# Patient Record
Sex: FEMALE | Race: WHITE | NOT HISPANIC OR LATINO
[De-identification: names, ages, dates, MRNs, and addresses within clinical notes are randomized per-mention and may not be internally consistent; named-entity substitution may affect disease eponyms.]

---

## 2018-03-19 PROBLEM — Z00.00 ENCOUNTER FOR PREVENTIVE HEALTH EXAMINATION: Status: ACTIVE | Noted: 2018-03-19

## 2018-03-23 ENCOUNTER — APPOINTMENT (OUTPATIENT)
Dept: SURGERY | Facility: CLINIC | Age: 68
End: 2018-03-23
Payer: MEDICARE

## 2018-03-23 VITALS
SYSTOLIC BLOOD PRESSURE: 147 MMHG | DIASTOLIC BLOOD PRESSURE: 80 MMHG | TEMPERATURE: 97.8 F | BODY MASS INDEX: 26.98 KG/M2 | OXYGEN SATURATION: 97 % | WEIGHT: 132.06 LBS | HEIGHT: 58.5 IN | HEART RATE: 95 BPM

## 2018-03-23 DIAGNOSIS — L72.3 SEBACEOUS CYST: ICD-10-CM

## 2018-03-23 DIAGNOSIS — Z85.3 PERSONAL HISTORY OF MALIGNANT NEOPLASM OF BREAST: ICD-10-CM

## 2018-03-23 DIAGNOSIS — Z78.9 OTHER SPECIFIED HEALTH STATUS: ICD-10-CM

## 2018-03-23 DIAGNOSIS — E78.00 PURE HYPERCHOLESTEROLEMIA, UNSPECIFIED: ICD-10-CM

## 2018-03-23 DIAGNOSIS — E07.9 DISORDER OF THYROID, UNSPECIFIED: ICD-10-CM

## 2018-03-23 PROCEDURE — 99203 OFFICE O/P NEW LOW 30 MIN: CPT

## 2018-03-23 RX ORDER — ATORVASTATIN CALCIUM 10 MG/1
10 TABLET, FILM COATED ORAL
Refills: 0 | Status: ACTIVE | COMMUNITY

## 2018-03-23 RX ORDER — LEVOTHYROXINE SODIUM 0.1 MG/1
100 TABLET ORAL
Refills: 0 | Status: ACTIVE | COMMUNITY

## 2020-01-22 ENCOUNTER — EMERGENCY (EMERGENCY)
Facility: HOSPITAL | Age: 70
LOS: 1 days | Discharge: HOME | End: 2020-01-22
Attending: EMERGENCY MEDICINE | Admitting: EMERGENCY MEDICINE
Payer: MEDICARE

## 2020-01-22 VITALS
HEIGHT: 62 IN | SYSTOLIC BLOOD PRESSURE: 166 MMHG | HEART RATE: 97 BPM | WEIGHT: 126.1 LBS | RESPIRATION RATE: 16 BRPM | OXYGEN SATURATION: 97 % | TEMPERATURE: 98 F | DIASTOLIC BLOOD PRESSURE: 100 MMHG

## 2020-01-22 VITALS — DIASTOLIC BLOOD PRESSURE: 86 MMHG | SYSTOLIC BLOOD PRESSURE: 148 MMHG

## 2020-01-22 PROCEDURE — 72125 CT NECK SPINE W/O DYE: CPT | Mod: 26

## 2020-01-22 PROCEDURE — 70450 CT HEAD/BRAIN W/O DYE: CPT | Mod: 26

## 2020-01-22 PROCEDURE — 99284 EMERGENCY DEPT VISIT MOD MDM: CPT

## 2020-01-22 PROCEDURE — 93010 ELECTROCARDIOGRAM REPORT: CPT

## 2020-01-22 NOTE — ED ADULT NURSE NOTE - CHIEF COMPLAINT QUOTE
struck head at home, doesn't remember circumstances, bleeding to scalp sent from [facility] for head ct

## 2020-01-22 NOTE — ED PROVIDER NOTE - PHYSICAL EXAMINATION
Physical Exam  GEN: Awake, alert, non-toxic appearing, NCAT  EYES: full EOMI,  ENT: External inspection normal, normal voice,   HEAD: right temporal scalp abrasion  NECK: FROM neck, supple, no midline tenderness/stepoff  CV: rrr  RESP: no tachypnea, no hypoxia, no resp distress,  MSK: FROM all 4 extremities,   SKIN: see HEAD exam  NEURO: Oriented x3, CN 2-12 grossly intact, moving all extremities, ambulating with cane (at baseline)

## 2020-01-22 NOTE — ED ADULT TRIAGE NOTE - CHIEF COMPLAINT QUOTE
struck head at home, doesn't remember circumstances, bleeding to scalp sent from [facility] for head ct

## 2020-01-22 NOTE — ED PROVIDER NOTE - OBJECTIVE STATEMENT
69 yof pw head injury.  pt states she may have slipped and fell earlier today, around noon.  reports occasional poor short term memory recall.  does not remember the exact details but states she was in the kitchen, where the floor is slippery and she wore socks instead of her shoes.  denies pain.  tdap < 5 yr.  hx of htn/hl.  states hx of syncope, and was instructed to lower herself down when she feels like an episode is coming.

## 2020-01-22 NOTE — ED ADULT NURSE NOTE - OBJECTIVE STATEMENT
68 y/o F with hx of HTN, takes a baby ASA 3x a week, c/o head injury. pt reports he floors are very slippery and she is not sure if she passed out or slipped and fell. does not recall events. pt denies nausea, vomiting. lac noted to scalp. no neuro deficits noted.

## 2020-01-22 NOTE — ED PROVIDER NOTE - CLINICAL SUMMARY MEDICAL DECISION MAKING FREE TEXT BOX
will check ct head/cervical spine, more likely slip/fall given history, screening ekg given unable to recall exact event,

## 2020-01-22 NOTE — ED PROVIDER NOTE - NSFOLLOWUPINSTRUCTIONS_ED_ALL_ED_FT
Follow up with your primary care doctor  Show the EKG from today to your primary care doctor/cardiologist  Take tylenol 650mg every 6 hours for pain as needed  Do NOT wash your hair for 2 days to allow the wound to heal  Return immediately for any new or worsening symptoms or any new concerns

## 2020-01-22 NOTE — ED PROVIDER NOTE - PATIENT PORTAL LINK FT
You can access the FollowMyHealth Patient Portal offered by [facility] by registering at the following website: [email or web address]. By joining [employer]’s FollowMyHealth portal, you will also be able to view your health information using other applications (apps) compatible with our system.
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
## 2020-01-27 DIAGNOSIS — S06.9X9A UNSPECIFIED INTRACRANIAL INJURY WITH LOSS OF CONSCIOUSNESS OF UNSPECIFIED DURATION, INITIAL ENCOUNTER: ICD-10-CM
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
## 2020-01-27 DIAGNOSIS — S00.01XA ABRASION OF SCALP, INITIAL ENCOUNTER: ICD-10-CM
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
## 2021-02-12 ENCOUNTER — EMERGENCY (EMERGENCY)
Facility: HOSPITAL | Age: 71
LOS: 1 days | Discharge: HOME | End: 2021-02-12
Admitting: EMERGENCY MEDICINE
Payer: MEDICARE

## 2021-02-12 VITALS
HEIGHT: 62 IN | DIASTOLIC BLOOD PRESSURE: 67 MMHG | TEMPERATURE: 100 F | SYSTOLIC BLOOD PRESSURE: 124 MMHG | HEART RATE: 111 BPM | WEIGHT: 125 LBS | OXYGEN SATURATION: 94 % | RESPIRATION RATE: 18 BRPM

## 2021-02-12 DIAGNOSIS — Z20.822 CONTACT WITH AND (SUSPECTED) EXPOSURE TO COVID-19: ICD-10-CM

## 2021-02-12 DIAGNOSIS — R50.9 FEVER, UNSPECIFIED: ICD-10-CM

## 2021-02-12 DIAGNOSIS — N39.0 URINARY TRACT INFECTION, SITE NOT SPECIFIED: ICD-10-CM

## 2021-02-12 DIAGNOSIS — Z88.2 ALLERGY STATUS TO SULFONAMIDES: ICD-10-CM

## 2021-02-12 LAB
ALBUMIN SERPL ELPH-MCNC: 2.9 G/DL — LOW (ref 3.4–5)
ALP SERPL-CCNC: 91 U/L — SIGNIFICANT CHANGE UP (ref 40–120)
ALT FLD-CCNC: 20 U/L — SIGNIFICANT CHANGE UP (ref 12–42)
AMPHET UR-MCNC: NEGATIVE — SIGNIFICANT CHANGE UP
ANION GAP SERPL CALC-SCNC: 11 MMOL/L — SIGNIFICANT CHANGE UP (ref 9–16)
APPEARANCE UR: CLEAR — SIGNIFICANT CHANGE UP
APTT BLD: 28.7 SEC — SIGNIFICANT CHANGE UP (ref 27.5–35.5)
AST SERPL-CCNC: 14 U/L — LOW (ref 15–37)
BACTERIA # UR AUTO: PRESENT /HPF
BARBITURATES UR SCN-MCNC: NEGATIVE — SIGNIFICANT CHANGE UP
BASOPHILS # BLD AUTO: 0.08 K/UL — SIGNIFICANT CHANGE UP (ref 0–0.2)
BASOPHILS NFR BLD AUTO: 0.5 % — SIGNIFICANT CHANGE UP (ref 0–2)
BENZODIAZ UR-MCNC: NEGATIVE — SIGNIFICANT CHANGE UP
BILIRUB SERPL-MCNC: 0.4 MG/DL — SIGNIFICANT CHANGE UP (ref 0.2–1.2)
BILIRUB UR-MCNC: NEGATIVE — SIGNIFICANT CHANGE UP
BUN SERPL-MCNC: 20 MG/DL — SIGNIFICANT CHANGE UP (ref 7–23)
CALCIUM SERPL-MCNC: 9.2 MG/DL — SIGNIFICANT CHANGE UP (ref 8.5–10.5)
CHLORIDE SERPL-SCNC: 103 MMOL/L — SIGNIFICANT CHANGE UP (ref 96–108)
CO2 SERPL-SCNC: 23 MMOL/L — SIGNIFICANT CHANGE UP (ref 22–31)
COCAINE METAB.OTHER UR-MCNC: NEGATIVE — SIGNIFICANT CHANGE UP
COLOR SPEC: YELLOW — SIGNIFICANT CHANGE UP
CREAT SERPL-MCNC: 0.86 MG/DL — SIGNIFICANT CHANGE UP (ref 0.5–1.3)
DIFF PNL FLD: ABNORMAL
EOSINOPHIL # BLD AUTO: 0.17 K/UL — SIGNIFICANT CHANGE UP (ref 0–0.5)
EOSINOPHIL NFR BLD AUTO: 1 % — SIGNIFICANT CHANGE UP (ref 0–6)
EPI CELLS # UR: SIGNIFICANT CHANGE UP /HPF (ref 0–5)
GLUCOSE SERPL-MCNC: 129 MG/DL — HIGH (ref 70–99)
GLUCOSE UR QL: NEGATIVE — SIGNIFICANT CHANGE UP
HCT VFR BLD CALC: 38.6 % — SIGNIFICANT CHANGE UP (ref 34.5–45)
HGB BLD-MCNC: 13.1 G/DL — SIGNIFICANT CHANGE UP (ref 11.5–15.5)
IMM GRANULOCYTES NFR BLD AUTO: 0.7 % — SIGNIFICANT CHANGE UP (ref 0–1.5)
INR BLD: 1.02 — SIGNIFICANT CHANGE UP (ref 0.88–1.16)
KETONES UR-MCNC: NEGATIVE — SIGNIFICANT CHANGE UP
LACTATE SERPL-SCNC: 1.4 MMOL/L — SIGNIFICANT CHANGE UP (ref 0.4–2)
LEUKOCYTE ESTERASE UR-ACNC: ABNORMAL
LIDOCAIN IGE QN: 109 U/L — SIGNIFICANT CHANGE UP (ref 73–393)
LYMPHOCYTES # BLD AUTO: 1.02 K/UL — SIGNIFICANT CHANGE UP (ref 1–3.3)
LYMPHOCYTES # BLD AUTO: 5.8 % — LOW (ref 13–44)
MAGNESIUM SERPL-MCNC: 2 MG/DL — SIGNIFICANT CHANGE UP (ref 1.6–2.6)
MCHC RBC-ENTMCNC: 31 PG — SIGNIFICANT CHANGE UP (ref 27–34)
MCHC RBC-ENTMCNC: 33.9 GM/DL — SIGNIFICANT CHANGE UP (ref 32–36)
MCV RBC AUTO: 91.3 FL — SIGNIFICANT CHANGE UP (ref 80–100)
METHADONE UR-MCNC: NEGATIVE — SIGNIFICANT CHANGE UP
MONOCYTES # BLD AUTO: 1.21 K/UL — HIGH (ref 0–0.9)
MONOCYTES NFR BLD AUTO: 6.9 % — SIGNIFICANT CHANGE UP (ref 2–14)
NEUTROPHILS # BLD AUTO: 14.85 K/UL — HIGH (ref 1.8–7.4)
NEUTROPHILS NFR BLD AUTO: 85.1 % — HIGH (ref 43–77)
NITRITE UR-MCNC: POSITIVE
NRBC # BLD: 0 /100 WBCS — SIGNIFICANT CHANGE UP (ref 0–0)
NT-PROBNP SERPL-SCNC: 208 PG/ML — SIGNIFICANT CHANGE UP
OPIATES UR-MCNC: NEGATIVE — SIGNIFICANT CHANGE UP
PCP SPEC-MCNC: SIGNIFICANT CHANGE UP
PCP UR-MCNC: NEGATIVE — SIGNIFICANT CHANGE UP
PH UR: 5.5 — SIGNIFICANT CHANGE UP (ref 5–8)
PLATELET # BLD AUTO: 324 K/UL — SIGNIFICANT CHANGE UP (ref 150–400)
POTASSIUM SERPL-MCNC: 3.5 MMOL/L — SIGNIFICANT CHANGE UP (ref 3.5–5.3)
POTASSIUM SERPL-SCNC: 3.5 MMOL/L — SIGNIFICANT CHANGE UP (ref 3.5–5.3)
PROT SERPL-MCNC: 7.2 G/DL — SIGNIFICANT CHANGE UP (ref 6.4–8.2)
PROT UR-MCNC: ABNORMAL MG/DL
PROTHROM AB SERPL-ACNC: 12.2 SEC — SIGNIFICANT CHANGE UP (ref 10.6–13.6)
RBC # BLD: 4.23 M/UL — SIGNIFICANT CHANGE UP (ref 3.8–5.2)
RBC # FLD: 14.5 % — SIGNIFICANT CHANGE UP (ref 10.3–14.5)
RBC CASTS # UR COMP ASSIST: < 5 /HPF — SIGNIFICANT CHANGE UP
SARS-COV-2 RNA SPEC QL NAA+PROBE: SIGNIFICANT CHANGE UP
SODIUM SERPL-SCNC: 137 MMOL/L — SIGNIFICANT CHANGE UP (ref 132–145)
SP GR SPEC: 1.01 — SIGNIFICANT CHANGE UP (ref 1–1.03)
THC UR QL: NEGATIVE — SIGNIFICANT CHANGE UP
TROPONIN I SERPL-MCNC: <0.017 NG/ML — LOW (ref 0.02–0.06)
UROBILINOGEN FLD QL: 0.2 E.U./DL — SIGNIFICANT CHANGE UP
WBC # BLD: 17.46 K/UL — HIGH (ref 3.8–10.5)
WBC # FLD AUTO: 17.46 K/UL — HIGH (ref 3.8–10.5)
WBC UR QL: ABNORMAL /HPF

## 2021-02-12 PROCEDURE — 99284 EMERGENCY DEPT VISIT MOD MDM: CPT | Mod: 25

## 2021-02-12 PROCEDURE — 71045 X-RAY EXAM CHEST 1 VIEW: CPT | Mod: 26

## 2021-02-12 PROCEDURE — 93010 ELECTROCARDIOGRAM REPORT: CPT

## 2021-02-12 RX ORDER — CEFTRIAXONE 500 MG/1
1000 INJECTION, POWDER, FOR SOLUTION INTRAMUSCULAR; INTRAVENOUS ONCE
Refills: 0 | Status: DISCONTINUED | OUTPATIENT
Start: 2021-02-12 | End: 2021-02-12

## 2021-02-12 RX ORDER — CIPROFLOXACIN LACTATE 400MG/40ML
500 VIAL (ML) INTRAVENOUS ONCE
Refills: 0 | Status: COMPLETED | OUTPATIENT
Start: 2021-02-12 | End: 2021-02-12

## 2021-02-12 RX ORDER — ACETAMINOPHEN 500 MG
975 TABLET ORAL ONCE
Refills: 0 | Status: COMPLETED | OUTPATIENT
Start: 2021-02-12 | End: 2021-02-12

## 2021-02-12 RX ORDER — SODIUM CHLORIDE 9 MG/ML
1000 INJECTION INTRAMUSCULAR; INTRAVENOUS; SUBCUTANEOUS ONCE
Refills: 0 | Status: COMPLETED | OUTPATIENT
Start: 2021-02-12 | End: 2021-02-12

## 2021-02-12 RX ORDER — IBUPROFEN 200 MG
600 TABLET ORAL ONCE
Refills: 0 | Status: COMPLETED | OUTPATIENT
Start: 2021-02-12 | End: 2021-02-12

## 2021-02-12 RX ADMIN — Medication 600 MILLIGRAM(S): at 22:46

## 2021-02-12 RX ADMIN — SODIUM CHLORIDE 1000 MILLILITER(S): 9 INJECTION INTRAMUSCULAR; INTRAVENOUS; SUBCUTANEOUS at 22:03

## 2021-02-12 RX ADMIN — Medication 500 MILLIGRAM(S): at 23:38

## 2021-02-12 NOTE — ED ADULT NURSE NOTE - CHIEF COMPLAINT QUOTE
Pt BIBA c/o flu-like s/s for several days. TMax 101.6, pt c/o body aches and chills. Pt finished COVID vax regimen 2/2/21. Pt last took 1g of acetaminophen at 20:30 today. PMH of HTN, hypothyroidism, and seizure disorder.

## 2021-02-13 VITALS
DIASTOLIC BLOOD PRESSURE: 71 MMHG | SYSTOLIC BLOOD PRESSURE: 112 MMHG | RESPIRATION RATE: 16 BRPM | TEMPERATURE: 100 F | HEART RATE: 90 BPM | OXYGEN SATURATION: 95 %

## 2021-02-13 RX ORDER — MOXIFLOXACIN HYDROCHLORIDE TABLETS, 400 MG 400 MG/1
1 TABLET, FILM COATED ORAL
Qty: 10 | Refills: 0
Start: 2021-02-13 | End: 2021-02-17

## 2021-02-13 NOTE — ED PROVIDER NOTE - CHPI ED SYMPTOMS NEG
no dizziness/no nausea/no numbness/no pain/no tingling/no vomiting/no weakness/no chills/no decreased eating/drinking

## 2021-02-13 NOTE — ED PROVIDER NOTE - CLINICAL SUMMARY MEDICAL DECISION MAKING FREE TEXT BOX
69 y/o female hx of UTIs with increased frequency. Patient appears well NAD  d/c with cipro and strict return precautions. Offered initial IVABX dose patient deferred to PO and PCP follow up

## 2021-02-13 NOTE — ED PROVIDER NOTE - OBJECTIVE STATEMENT
69 y/o female hx of UTIs,seizures and htn now here with fever and bodyaches in the setting of increased urination. Patient appears well NAD. Denies chest pain,nausea,vomiting,diarrhea,chills,sob,trauma,loc. Patient states she took cipro last year which worked. Patient states symptoms have been ongoing for several days.

## 2021-02-13 NOTE — ED PROVIDER NOTE - PATIENT PORTAL LINK FT
You can access the FollowMyHealth Patient Portal offered by [facility] by registering at the following website: [email or web address]. By joining [employer]’s FollowMyHealth portal, you will also be able to view your health information using other applications (apps) compatible with our system.

## 2021-02-15 LAB
-  AMIKACIN: SIGNIFICANT CHANGE UP
-  AMOXICILLIN/CLAVULANIC ACID: SIGNIFICANT CHANGE UP
-  AMPICILLIN/SULBACTAM: SIGNIFICANT CHANGE UP
-  AMPICILLIN: SIGNIFICANT CHANGE UP
-  AZTREONAM: SIGNIFICANT CHANGE UP
-  CEFAZOLIN: SIGNIFICANT CHANGE UP
-  CEFEPIME: SIGNIFICANT CHANGE UP
-  CEFOTAXIME: SIGNIFICANT CHANGE UP
-  CEFOXITIN: SIGNIFICANT CHANGE UP
-  CEFTAZIDIME: SIGNIFICANT CHANGE UP
-  CEFTRIAXONE: SIGNIFICANT CHANGE UP
-  CEFUROXIME: SIGNIFICANT CHANGE UP
-  CIPROFLOXACIN: SIGNIFICANT CHANGE UP
-  ERTAPENEM: SIGNIFICANT CHANGE UP
-  GENTAMICIN: SIGNIFICANT CHANGE UP
-  IMIPENEM: SIGNIFICANT CHANGE UP
-  LEVOFLOXACIN: SIGNIFICANT CHANGE UP
-  MEROPENEM: SIGNIFICANT CHANGE UP
-  MINOCYCLINE: SIGNIFICANT CHANGE UP
-  NITROFURANTOIN: SIGNIFICANT CHANGE UP
-  PIPERACILLIN/TAZOBACTAM: SIGNIFICANT CHANGE UP
-  TIGECYCLINE: SIGNIFICANT CHANGE UP
-  TOBRAMYCIN: SIGNIFICANT CHANGE UP
-  TRIMETHOPRIM/SULFAMETHOXAZOLE: SIGNIFICANT CHANGE UP
CULTURE RESULTS: SIGNIFICANT CHANGE UP
METHOD TYPE: SIGNIFICANT CHANGE UP
ORGANISM # SPEC MICROSCOPIC CNT: SIGNIFICANT CHANGE UP
ORGANISM # SPEC MICROSCOPIC CNT: SIGNIFICANT CHANGE UP
SPECIMEN SOURCE: SIGNIFICANT CHANGE UP

## 2021-04-10 ENCOUNTER — EMERGENCY (EMERGENCY)
Facility: HOSPITAL | Age: 71
LOS: 1 days | Discharge: HOME | End: 2021-04-10
Attending: EMERGENCY MEDICINE | Admitting: EMERGENCY MEDICINE
Payer: MEDICARE

## 2021-04-10 VITALS
SYSTOLIC BLOOD PRESSURE: 150 MMHG | RESPIRATION RATE: 16 BRPM | DIASTOLIC BLOOD PRESSURE: 77 MMHG | HEIGHT: 62 IN | WEIGHT: 125 LBS | OXYGEN SATURATION: 96 % | TEMPERATURE: 99 F | HEART RATE: 101 BPM

## 2021-04-10 DIAGNOSIS — R07.9 CHEST PAIN, UNSPECIFIED: ICD-10-CM

## 2021-04-10 DIAGNOSIS — E03.9 HYPOTHYROIDISM, UNSPECIFIED: ICD-10-CM

## 2021-04-10 DIAGNOSIS — Z90.10 ACQUIRED ABSENCE OF UNSPECIFIED BREAST AND NIPPLE: ICD-10-CM

## 2021-04-10 DIAGNOSIS — Z88.2 ALLERGY STATUS TO SULFONAMIDES: ICD-10-CM

## 2021-04-10 DIAGNOSIS — Z79.2 LONG TERM (CURRENT) USE OF ANTIBIOTICS: ICD-10-CM

## 2021-04-10 DIAGNOSIS — R00.0 TACHYCARDIA, UNSPECIFIED: ICD-10-CM

## 2021-04-10 DIAGNOSIS — E78.5 HYPERLIPIDEMIA, UNSPECIFIED: ICD-10-CM

## 2021-04-10 DIAGNOSIS — I10 ESSENTIAL (PRIMARY) HYPERTENSION: ICD-10-CM

## 2021-04-10 LAB
ALBUMIN SERPL ELPH-MCNC: 3.7 G/DL — SIGNIFICANT CHANGE UP (ref 3.4–5)
ALP SERPL-CCNC: 105 U/L — SIGNIFICANT CHANGE UP (ref 40–120)
ALT FLD-CCNC: 29 U/L — SIGNIFICANT CHANGE UP (ref 12–42)
ANION GAP SERPL CALC-SCNC: 10 MMOL/L — SIGNIFICANT CHANGE UP (ref 9–16)
AST SERPL-CCNC: 25 U/L — SIGNIFICANT CHANGE UP (ref 15–37)
BASOPHILS # BLD AUTO: 0.06 K/UL — SIGNIFICANT CHANGE UP (ref 0–0.2)
BASOPHILS NFR BLD AUTO: 0.6 % — SIGNIFICANT CHANGE UP (ref 0–2)
BILIRUB SERPL-MCNC: 0.3 MG/DL — SIGNIFICANT CHANGE UP (ref 0.2–1.2)
BUN SERPL-MCNC: 27 MG/DL — HIGH (ref 7–23)
CALCIUM SERPL-MCNC: 9.7 MG/DL — SIGNIFICANT CHANGE UP (ref 8.5–10.5)
CHLORIDE SERPL-SCNC: 104 MMOL/L — SIGNIFICANT CHANGE UP (ref 96–108)
CK MB BLD-MCNC: 1.63 % — SIGNIFICANT CHANGE UP
CK MB CFR SERPL CALC: 1.7 NG/ML — SIGNIFICANT CHANGE UP (ref 0.5–3.6)
CK SERPL-CCNC: 104 U/L — SIGNIFICANT CHANGE UP (ref 26–192)
CO2 SERPL-SCNC: 28 MMOL/L — SIGNIFICANT CHANGE UP (ref 22–31)
CREAT SERPL-MCNC: 0.98 MG/DL — SIGNIFICANT CHANGE UP (ref 0.5–1.3)
D DIMER BLD IA.RAPID-MCNC: <187 NG/ML DDU — SIGNIFICANT CHANGE UP
EOSINOPHIL # BLD AUTO: 0.26 K/UL — SIGNIFICANT CHANGE UP (ref 0–0.5)
EOSINOPHIL NFR BLD AUTO: 2.4 % — SIGNIFICANT CHANGE UP (ref 0–6)
GLUCOSE SERPL-MCNC: 97 MG/DL — SIGNIFICANT CHANGE UP (ref 70–99)
HCT VFR BLD CALC: 42.8 % — SIGNIFICANT CHANGE UP (ref 34.5–45)
HGB BLD-MCNC: 14.4 G/DL — SIGNIFICANT CHANGE UP (ref 11.5–15.5)
IMM GRANULOCYTES NFR BLD AUTO: 0.5 % — SIGNIFICANT CHANGE UP (ref 0–1.5)
LYMPHOCYTES # BLD AUTO: 2.74 K/UL — SIGNIFICANT CHANGE UP (ref 1–3.3)
LYMPHOCYTES # BLD AUTO: 25.4 % — SIGNIFICANT CHANGE UP (ref 13–44)
MCHC RBC-ENTMCNC: 31.2 PG — SIGNIFICANT CHANGE UP (ref 27–34)
MCHC RBC-ENTMCNC: 33.6 GM/DL — SIGNIFICANT CHANGE UP (ref 32–36)
MCV RBC AUTO: 92.6 FL — SIGNIFICANT CHANGE UP (ref 80–100)
MONOCYTES # BLD AUTO: 1.42 K/UL — HIGH (ref 0–0.9)
MONOCYTES NFR BLD AUTO: 13.2 % — SIGNIFICANT CHANGE UP (ref 2–14)
NEUTROPHILS # BLD AUTO: 6.25 K/UL — SIGNIFICANT CHANGE UP (ref 1.8–7.4)
NEUTROPHILS NFR BLD AUTO: 57.9 % — SIGNIFICANT CHANGE UP (ref 43–77)
NRBC # BLD: 0 /100 WBCS — SIGNIFICANT CHANGE UP (ref 0–0)
NT-PROBNP SERPL-SCNC: 81 PG/ML — SIGNIFICANT CHANGE UP
PLATELET # BLD AUTO: 336 K/UL — SIGNIFICANT CHANGE UP (ref 150–400)
POTASSIUM SERPL-MCNC: 4.3 MMOL/L — SIGNIFICANT CHANGE UP (ref 3.5–5.3)
POTASSIUM SERPL-SCNC: 4.3 MMOL/L — SIGNIFICANT CHANGE UP (ref 3.5–5.3)
PROT SERPL-MCNC: 7.8 G/DL — SIGNIFICANT CHANGE UP (ref 6.4–8.2)
RBC # BLD: 4.62 M/UL — SIGNIFICANT CHANGE UP (ref 3.8–5.2)
RBC # FLD: 16.4 % — HIGH (ref 10.3–14.5)
SODIUM SERPL-SCNC: 142 MMOL/L — SIGNIFICANT CHANGE UP (ref 132–145)
TROPONIN I SERPL-MCNC: 0.02 NG/ML — SIGNIFICANT CHANGE UP (ref 0.02–0.06)
TROPONIN I SERPL-MCNC: <0.017 NG/ML — LOW (ref 0.02–0.06)
WBC # BLD: 10.78 K/UL — HIGH (ref 3.8–10.5)
WBC # FLD AUTO: 10.78 K/UL — HIGH (ref 3.8–10.5)

## 2021-04-10 PROCEDURE — 71046 X-RAY EXAM CHEST 2 VIEWS: CPT | Mod: 26

## 2021-04-10 PROCEDURE — 93010 ELECTROCARDIOGRAM REPORT: CPT

## 2021-04-10 PROCEDURE — 99285 EMERGENCY DEPT VISIT HI MDM: CPT | Mod: 25

## 2021-04-10 RX ORDER — ASPIRIN/CALCIUM CARB/MAGNESIUM 324 MG
324 TABLET ORAL ONCE
Refills: 0 | Status: COMPLETED | OUTPATIENT
Start: 2021-04-10 | End: 2021-04-10

## 2021-04-10 NOTE — ED PROVIDER NOTE - PATIENT PORTAL LINK FT
You can access the FollowMyHealth Patient Portal offered by [facility] by registering at the following website: [email or web address]. By joining [employer]’s FollowMyHealth portal, you will also be able to view your health information using other applications (apps) compatible with our system.

## 2021-04-10 NOTE — ED PROVIDER NOTE - PROGRESS NOTE DETAILS
Pt first trop and labs unremarkable. Second trop slight increased from first. Due to this finding, will keep pt for a 3rd trop to check if trending up or stable. Pt and  amenable to plan. Pt provided copy of her EKG per her request. Will obtain 3rd Trop at 02:00. Pt has no chest pain or other symptoms in the ED at this time. 3rd trop wnl, pt remains asx, d/w f/u w/ primary cardiologist or w/ H

## 2021-04-10 NOTE — ED PROVIDER NOTE - OBJECTIVE STATEMENT
70 yo F with HTN, HLD, Hypothyroid, previous breast CA s/p lumpectomy in 2014 that is monitored yearly with mammograms that presents with chest pain. Pt reports today 4 hours prior to arrival was at home, at rest, started having "chest discomfort", She would not describe it as sharp or dull "just pain". Since it has been constant, non radiating, 3/10, no meds taken for this. No other associated symptoms such as NO SOB, headache, vision/hearing changes, paresthesias, abd pain, N/V/D, fever, chills. She has been told in past she has an abnormal EKG "a variant". No recent illnesses, no trauma, no travel. Non smoker, no drinking, no drugs. No urinary symptoms. Otherwise has been tolerating PO normally.

## 2021-04-10 NOTE — ED PROVIDER NOTE - CARE PROVIDER_API CALL
Raciel Miller)  Cardiovascular Disease  [address] [facility] [place] [address]  Phone: [phone]  Fax: [phone]  Follow Up Time:

## 2021-04-10 NOTE — ED PROVIDER NOTE - MUSCULOSKELETAL, MLM
Spine appears normal, range of motion is not limited. tenderness to palpation left chest wall. no signs trauma.

## 2021-04-10 NOTE — ED PROVIDER NOTE - NSFOLLOWUPINSTRUCTIONS_ED_ALL_ED_FT
Follow up with your primary care doctor/cardiologist  Discuss the results with your doctor  Return immediately for any new or worsening symptoms or any new concerns

## 2021-04-11 VITALS
DIASTOLIC BLOOD PRESSURE: 65 MMHG | OXYGEN SATURATION: 96 % | HEART RATE: 76 BPM | RESPIRATION RATE: 20 BRPM | TEMPERATURE: 98 F | SYSTOLIC BLOOD PRESSURE: 111 MMHG

## 2021-04-11 LAB — TROPONIN I SERPL-MCNC: <0.017 NG/ML — LOW (ref 0.02–0.06)

## 2021-04-11 RX ORDER — LISINOPRIL 2.5 MG/1
20 TABLET ORAL ONCE
Refills: 0 | Status: COMPLETED | OUTPATIENT
Start: 2021-04-11 | End: 2021-04-11

## 2021-04-11 RX ORDER — AMLODIPINE BESYLATE 2.5 MG/1
5 TABLET ORAL ONCE
Refills: 0 | Status: COMPLETED | OUTPATIENT
Start: 2021-04-11 | End: 2021-04-11

## 2021-04-11 RX ADMIN — AMLODIPINE BESYLATE 5 MILLIGRAM(S): 2.5 TABLET ORAL at 00:26

## 2021-04-11 RX ADMIN — LISINOPRIL 20 MILLIGRAM(S): 2.5 TABLET ORAL at 00:26

## 2021-04-11 NOTE — ED ADULT NURSE REASSESSMENT NOTE - NS ED NURSE REASSESS COMMENT FT1
Received Pt from previous RN sitting up on stretcher awake and alert, breathing with ease on RA and NAD. IV removed as per MD for DC. Family at the bedside.

## 2021-04-11 NOTE — ED ADULT NURSE NOTE - NSIMPLEMENTINTERV_GEN_ALL_ED
Implemented All Universal Safety Interventions:  [place] to call system. Call bell, personal items and telephone within reach. Instruct patient to call for assistance. Room bathroom lighting operational. Non-slip footwear when patient is off stretcher. Physically safe environment: no spills, clutter or unnecessary equipment. Stretcher in lowest position, wheels locked, appropriate side rails in place.

## 2021-05-29 ENCOUNTER — EMERGENCY (EMERGENCY)
Facility: HOSPITAL | Age: 71
LOS: 1 days | Discharge: HOME | End: 2021-05-29
Attending: EMERGENCY MEDICINE | Admitting: EMERGENCY MEDICINE
Payer: MEDICARE

## 2021-05-29 VITALS
DIASTOLIC BLOOD PRESSURE: 78 MMHG | HEART RATE: 97 BPM | OXYGEN SATURATION: 98 % | WEIGHT: 126.1 LBS | HEIGHT: 62 IN | SYSTOLIC BLOOD PRESSURE: 143 MMHG | RESPIRATION RATE: 17 BRPM | TEMPERATURE: 98 F

## 2021-05-29 DIAGNOSIS — N30.00 ACUTE CYSTITIS WITHOUT HEMATURIA: ICD-10-CM

## 2021-05-29 DIAGNOSIS — Z91.013 ALLERGY TO SEAFOOD: ICD-10-CM

## 2021-05-29 DIAGNOSIS — Z88.2 ALLERGY STATUS TO SULFONAMIDES: ICD-10-CM

## 2021-05-29 DIAGNOSIS — E78.5 HYPERLIPIDEMIA, UNSPECIFIED: ICD-10-CM

## 2021-05-29 DIAGNOSIS — I10 ESSENTIAL (PRIMARY) HYPERTENSION: ICD-10-CM

## 2021-05-29 DIAGNOSIS — R35.0 FREQUENCY OF MICTURITION: ICD-10-CM

## 2021-05-29 DIAGNOSIS — E03.9 HYPOTHYROIDISM, UNSPECIFIED: ICD-10-CM

## 2021-05-29 LAB
APPEARANCE UR: CLEAR — SIGNIFICANT CHANGE UP
BACTERIA # UR AUTO: PRESENT /HPF
BILIRUB UR-MCNC: NEGATIVE — SIGNIFICANT CHANGE UP
COLOR SPEC: YELLOW — SIGNIFICANT CHANGE UP
DIFF PNL FLD: ABNORMAL
EPI CELLS # UR: SIGNIFICANT CHANGE UP /HPF (ref 0–5)
GLUCOSE UR QL: NEGATIVE — SIGNIFICANT CHANGE UP
KETONES UR-MCNC: NEGATIVE — SIGNIFICANT CHANGE UP
LEUKOCYTE ESTERASE UR-ACNC: ABNORMAL
NITRITE UR-MCNC: NEGATIVE — SIGNIFICANT CHANGE UP
PH UR: 7 — SIGNIFICANT CHANGE UP (ref 5–8)
PROT UR-MCNC: ABNORMAL MG/DL
RBC CASTS # UR COMP ASSIST: < 5 /HPF — SIGNIFICANT CHANGE UP
SP GR SPEC: 1.02 — SIGNIFICANT CHANGE UP (ref 1–1.03)
UROBILINOGEN FLD QL: 0.2 E.U./DL — SIGNIFICANT CHANGE UP
WBC UR QL: ABNORMAL /HPF

## 2021-05-29 PROCEDURE — 99283 EMERGENCY DEPT VISIT LOW MDM: CPT

## 2021-05-29 RX ORDER — MOXIFLOXACIN HYDROCHLORIDE TABLETS, 400 MG 400 MG/1
1 TABLET, FILM COATED ORAL
Qty: 14 | Refills: 0
Start: 2021-05-29 | End: 2021-06-04

## 2021-05-29 RX ORDER — CIPROFLOXACIN LACTATE 400MG/40ML
500 VIAL (ML) INTRAVENOUS ONCE
Refills: 0 | Status: COMPLETED | OUTPATIENT
Start: 2021-05-29 | End: 2021-05-29

## 2021-05-29 RX ADMIN — Medication 500 MILLIGRAM(S): at 12:12

## 2021-05-29 NOTE — ED PROVIDER NOTE - PROGRESS NOTE DETAILS
patient remains well.  discuss results, dx, treatment and fu plan.  advise return for worsening or concerning symptoms especiallY:  fever, back pain, abd pain.  patient verbalizes understanding of discussion and plan.

## 2021-05-29 NOTE — ED PROVIDER NOTE - CLINICAL SUMMARY MEDICAL DECISION MAKING FREE TEXT BOX
71y female presenting with urinary frequency x1 week. 71y female presenting with urinary frequency x1 week.  UA c/w UTI.  Will initiate tx with cipro, rec fu urology, return for worsening symptoms

## 2021-05-29 NOTE — ED PROVIDER NOTE - PATIENT PORTAL LINK FT
You can access the FollowMyHealth Patient Portal offered by [facility] by registering at the following website: [email or web address]. By joining [employer]’s FollowMyHealth portal, you will also be able to view your health information using other applications (apps) compatible with our system.

## 2021-05-29 NOTE — ED ADULT TRIAGE NOTE - CHIEF COMPLAINT QUOTE
Pt walked into ed c.o urinary frequency and urgency x 1 week. Pt denies dysuria, hematuria, fever chills or flank pain at this time

## 2021-05-29 NOTE — ED PROVIDER NOTE - CARE PROVIDER_API CALL
Juan Pastor)  Urology  [address] [place] [address]  Phone: [phone]  Fax: [phone]  Follow Up Time:

## 2021-05-29 NOTE — ED PROVIDER NOTE - OBJECTIVE STATEMENT
71y Female with a PMHx of Hyperlipidemia, Hypertension, and Hypothyroid presents to the ED complaining of urinary frequency x1week. Patient denies dysuria, hematuria, fever, chills, or flank pain. 71y Female with a PMHx of Hyperlipidemia, Hypertension, and Hypothyroid presents to the ED complaining of urinary frequency x1week. Patient denies dysuria, hematuria, fever, chills, or flank pain. States she has her usual symptom of frequency to the point where she does not make it to the bathroom in time.  Patient has had frequent UTI since November 2020.  told she has a prolapse-no sure if bladder or uterine, but description more likely uterine.  Has not been to a urologist.  past infections resistant to multiple meds, but cipro has worked consistently.

## 2021-05-29 NOTE — ED PROVIDER NOTE - NS ED ROS FT
Constitutional:  No fever, no weakness, no dizziness, no wt loss  HEENT:  No change in vision, no discharge, no congestion  Cardiac:  No chest pain, palpitations  Pulm:  No cough, no sob  GI:  No abd pain, no vomiting, no diarrhea  : No hematuria, dysuria +frequency  Neuro:  No ha, no neck pain, no numbness, no weakness, no change in speech, vision or gait  MSK:  No joint pain or swelling.  No back pain   Skin:  No rash

## 2021-05-29 NOTE — ED PROVIDER NOTE - PHYSICAL EXAMINATION
General:  Well appearing, no distress  HEENT:  No conjunctival injection, no ocular discharge, external ears WNL, no pharyngeal injection or exudates  Chest:  Non-tender, no crepitance  Lungs:  Clear to auscultation bilaterally   Heart:  s1s2 normal, no murmur  Abdomen:  soft, non-tender, non-distended  :  Deferred  Rectal:  Deferred  Extremities: No edema, normal perfusion, no joint swelling or tenderness  Neuro:  Alert and oriented x 3, cn2-12 motor sensory grossly intact   Back:  no cva tnderness   Psychiatry:  Calm, cooperative, no expression of suicidal or homicidal ideation

## 2021-05-29 NOTE — ED PROVIDER NOTE - NSFOLLOWUPINSTRUCTIONS_ED_ALL_ED_FT
WHAT YOU NEED TO KNOW:    What is a urinary tract infection (UTI)? A UTI is caused by bacteria that get inside your urinary tract. Your urinary tract includes your kidneys, ureters, bladder, and urethra. Urine is made in your kidneys, and it flows from the ureters to the bladder. Urine leaves the bladder through the urethra. A UTI is more common in your lower urinary tract, which includes your bladder and urethra.     Female Urinary System         What increases my risk for a UTI?   •A urinary catheter or self-catheterization      •Pregnancy      •Urinary tract problems, such as a narrowing, kidney stones, or inability to empty your bladder completely      •History of a UTI      •Sexual intercourse      •Menopause      •Diabetes or obesity      What are the signs and symptoms of a UTI?   •Urinating more often than usual, leaking urine, or waking from sleep to urinate      •Pain or burning when you urinate      •Pain or pressure in your lower abdomen       •Urine that smells bad      •Blood in your urine      How is a UTI diagnosed? Your healthcare provider will ask about your signs and symptoms. Your provider may press on your abdomen, sides, and back to check if you feel pain. Your urine will be tested for bacteria that may be causing your infection. If you have UTIs often, you may need more tests to find the cause.    How is a UTI treated?   •Antibiotics help fight a bacterial infection. If you have UTIs often (called recurrent UTIs), you may be given antibiotics to take regularly. You will be given directions for when and how to use antibiotics. The goal is to prevent UTIs but not cause antibiotic resistance by using antibiotics too often.      •Medicines may be given to decrease pain and burning when you urinate. They will also help decrease the feeling that you need to urinate often. These medicines will make your urine orange or red.      What can I do to prevent a UTI?   •Talk to your healthcare provider about your birth control method. You may need to change your method if it is increasing your risk for UTIs.      •Empty your bladder often. Urinate and empty your bladder as soon as you feel the need. Do not hold your urine for long periods of time.      •Wipe from front to back after you urinate or have a bowel movement. This will help prevent germs from getting into your urinary tract through your urethra.      •Drink liquids as directed. Ask how much liquid to drink each day and which liquids are best for you. You may need to drink more liquids than usual to help flush out the bacteria. Do not drink alcohol, caffeine, or citrus juices. These can irritate your bladder and increase your symptoms. Your healthcare provider may recommend cranberry juice to help prevent a UTI.      •Urinate after you have sex. This can help flush out bacteria passed during sex.      •Do not douche or use feminine deodorants. These can change the chemical balance in your vagina.      •Change sanitary pads or tampons often. This will help prevent germs from getting into your urinary tract.       •Talk to your healthcare provider about your birth control method. You may need to change your method if it is increasing your risk for UTIs.      •Wear cotton underwear and clothes that are loose. Tight pants and nylon underwear can trap moisture and cause bacteria to grow.      •Vaginal estrogen may be recommended. This medicine helps prevent UTIs in women who have gone through menopause or are in tosha-menopause.      •Do pelvic muscle exercises often. Pelvic muscle exercises may help you start and stop urinating. Strong pelvic muscles may help you empty your bladder easier. Squeeze these muscles tightly for 5 seconds like you are trying to hold back urine. Then relax for 5 seconds. Gradually work up to squeezing for 10 seconds. Do 3 sets of 15 repetitions a day, or as directed.      RETURN TO THE EMERGENCY ROOM IMMEDIATELY FOR:    •You are urinating very little or not at all.      •You have a fever OR shaking chills.       •You have side or back pain -THIS COULD MEAN THE INFECTION IS IN THE KIDNEY AND THIS IS VERY SERIOUS     YOU are vomiting or have pain in your stomach.      MAKE A FOLLOW UP APPOINTMENT WITH THE UROLOGIST LISTED BELOW OR ANY UROLOGIST ON YOUR PLAN FOR AS SOON AS POSSIBLE.  SEE YOUR PRIMARY DOCTOR THIS WEEK

## 2021-05-31 LAB
-  AMIKACIN: SIGNIFICANT CHANGE UP
-  AMOXICILLIN/CLAVULANIC ACID: SIGNIFICANT CHANGE UP
-  AMPICILLIN/SULBACTAM: SIGNIFICANT CHANGE UP
-  AMPICILLIN: SIGNIFICANT CHANGE UP
-  AZTREONAM: SIGNIFICANT CHANGE UP
-  CEFAZOLIN: SIGNIFICANT CHANGE UP
-  CEFEPIME: SIGNIFICANT CHANGE UP
-  CEFOXITIN: SIGNIFICANT CHANGE UP
-  CEFTRIAXONE: SIGNIFICANT CHANGE UP
-  CIPROFLOXACIN: SIGNIFICANT CHANGE UP
-  ERTAPENEM: SIGNIFICANT CHANGE UP
-  GENTAMICIN: SIGNIFICANT CHANGE UP
-  LEVOFLOXACIN: SIGNIFICANT CHANGE UP
-  MEROPENEM: SIGNIFICANT CHANGE UP
-  NITROFURANTOIN: SIGNIFICANT CHANGE UP
-  PIPERACILLIN/TAZOBACTAM: SIGNIFICANT CHANGE UP
-  TOBRAMYCIN: SIGNIFICANT CHANGE UP
-  TRIMETHOPRIM/SULFAMETHOXAZOLE: SIGNIFICANT CHANGE UP
METHOD TYPE: SIGNIFICANT CHANGE UP

## 2021-05-31 NOTE — ED POST DISCHARGE NOTE - RESULT SUMMARY
UCx + proteus mirabilis. d/c on cipro. sensitivities pending. UCx + proteus mirabilis. d/c on cipro. sensitivity appropriate.

## 2021-06-01 LAB
-  AMPICILLIN: SIGNIFICANT CHANGE UP
-  CIPROFLOXACIN: SIGNIFICANT CHANGE UP
-  LEVOFLOXACIN: SIGNIFICANT CHANGE UP
-  NITROFURANTOIN: SIGNIFICANT CHANGE UP
-  TETRACYCLINE: SIGNIFICANT CHANGE UP
-  VANCOMYCIN: SIGNIFICANT CHANGE UP
CULTURE RESULTS: SIGNIFICANT CHANGE UP
METHOD TYPE: SIGNIFICANT CHANGE UP
ORGANISM # SPEC MICROSCOPIC CNT: SIGNIFICANT CHANGE UP
SPECIMEN SOURCE: SIGNIFICANT CHANGE UP

## 2022-04-27 NOTE — ED ADULT NURSE NOTE - OBJECTIVE STATEMENT
Cephalexin Counseling: I counseled the patient regarding use of cephalexin as an antibiotic for prophylactic and/or therapeutic purposes. Cephalexin (commonly prescribed under brand name Keflex) is a cephalosporin antibiotic which is active against numerous classes of bacteria, including most skin bacteria. Side effects may include nausea, diarrhea, gastrointestinal upset, rash, hives, yeast infections, and in rare cases, hepatitis, kidney disease, seizures, fever, confusion, neurologic symptoms, and others. Patients with severe allergies to penicillin medications are cautioned that there is about a 10% incidence of cross-reactivity with cephalosporins. When possible, patients with penicillin allergies should use alternatives to cephalosporins for antibiotic therapy. Tetracycline Pregnancy And Lactation Text: This medication is Pregnancy Category D and not consider safe during pregnancy. It is also excreted in breast milk. Fluconazole Counseling:  Patient counseled regarding adverse effects of fluconazole including but not limited to headache, diarrhea, nausea, upset stomach, liver function test abnormalities, taste disturbance, and stomach pain.  There is a rare possibility of liver failure that can occur when taking fluconazole.  The patient understands that monitoring of LFTs and kidney function test may be required, especially at baseline. The patient verbalized understanding of the proper use and possible adverse effects of fluconazole.  All of the patient's questions and concerns were addressed. Dutasteride Male Counseling: Dustasteride Counseling:  I discussed with the patient the risks of use of dutasteride including but not limited to decreased libido, decreased ejaculate volume, and gynecomastia. Women who can become pregnant should not handle medication.  All of the patient's questions and concerns were addressed. Oral Minoxidil Pregnancy And Lactation Text: This medication should only be used when clearly needed if you are pregnant, attempting to become pregnant or breast feeding. Odomzo Pregnancy And Lactation Text: This medication is Pregnancy Category X and is absolutely contraindicated during pregnancy. It is unknown if it is excreted in breast milk. Terbinafine Counseling: Patient counseling regarding adverse effects of terbinafine including but not limited to headache, diarrhea, rash, upset stomach, liver function test abnormalities, itching, taste/smell disturbance, nausea, abdominal pain, and flatulence.  There is a rare possibility of liver failure that can occur when taking terbinafine.  The patient understands that a baseline LFT and kidney function test may be required. The patient verbalized understanding of the proper use and possible adverse effects of terbinafine.  All of the patient's questions and concerns were addressed. Hydroquinone Counseling:  Patient advised that medication may result in skin irritation, lightening (hypopigmentation), dryness, and burning.  In the event of skin irritation, the patient was advised to reduce the amount of the drug applied or use it less frequently.  Rarely, spots that are treated with hydroquinone can become darker (pseudoochronosis).  Should this occur, patient instructed to stop medication and call the office. The patient verbalized understanding of the proper use and possible adverse effects of hydroquinone.  All of the patient's questions and concerns were addressed. Tremfya Counseling: I discussed with the patient the risks of guselkumab including but not limited to immunosuppression, serious infections, and drug reactions.  The patient understands that monitoring is required including a PPD at baseline and must alert us or the primary physician if symptoms of infection or other concerning signs are noted. Hydroxychloroquine Counseling:  I discussed with the patient that a baseline ophthalmologic exam is needed at the start of therapy and every year thereafter while on therapy. A CBC may also be warranted for monitoring.  The side effects of this medication were discussed with the patient, including but not limited to agranulocytosis, aplastic anemia, seizures, rashes, retinopathy, and liver toxicity. Patient instructed to call the office should any adverse effect occur.  The patient verbalized understanding of the proper use and possible adverse effects of Plaquenil.  All the patient's questions and concerns were addressed. High Dose Vitamin A Pregnancy And Lactation Text: High dose vitamin A therapy is contraindicated during pregnancy and breast feeding. Taltz Counseling: I discussed with the patient the risks of ixekizumab including but not limited to immunosuppression, serious infections, worsening of inflammatory bowel disease and drug reactions.  The patient understands that monitoring is required including a PPD at baseline and must alert us or the primary physician if symptoms of infection or other concerning signs are noted. High Dose Vitamin A Counseling: Side effects reviewed, pt to contact office should one occur. Rifampin Counseling: I discussed with the patient the risks of rifampin including but not limited to liver damage, kidney damage, red-orange body fluids, nausea/vomiting and severe allergy. Opzelura Pregnancy And Lactation Text: There is insufficient data to evaluate drug-associated risk for major birth defects, miscarriage, or other adverse maternal or fetal outcomes.  There is a pregnancy registry that monitors pregnancy outcomes in pregnant persons exposed to the medication during pregnancy.  It is unknown if this medication is excreted in breast milk.  Do not breastfeed during treatment and for about 4 weeks after the last dose. Prednisone Counseling:  I discussed with the patient the risks of prolonged use of prednisone including but not limited to weight gain, insomnia, osteoporosis, mood changes, diabetes, susceptibility to infection, glaucoma and high blood pressure.  In cases where prednisone use is prolonged, patients should be monitored with blood pressure checks, serum glucose levels and an eye exam.  Additionally, the patient may need to be placed on GI prophylaxis, PCP prophylaxis, and calcium and vitamin D supplementation and/or a bisphosphonate.  The patient verbalized understanding of the proper use and the possible adverse effects of prednisone.  All of the patient's questions and concerns were addressed. Rhofade Pregnancy And Lactation Text: This medication has not been assigned a Pregnancy Risk Category. It is unknown if the medication is excreted in breast milk. Picato Counseling:  I discussed with the patient the risks of Picato including but not limited to erythema, scaling, itching, weeping, crusting, and pain. Topical Ketoconazole Counseling: Patient counseled that this medication may cause skin irritation or allergic reactions.  In the event of skin irritation, the patient was advised to reduce the amount of the drug applied or use it less frequently.   The patient verbalized understanding of the proper use and possible adverse effects of ketoconazole.  All of the patient's questions and concerns were addressed. 70y female presents to ED for fever. Pt states she has had fever and rigor at home x2days, partial relief with tylenol. PMH of hypothyroid, breast ca, dilutional hyponatremia with seizures.  2nd covid vax on 2/2. Flu vax in September. Denies cough, SOB, headache, n/v/d. A&Ox4. Infliximab Pregnancy And Lactation Text: This medication is Pregnancy Category B and is considered safe during pregnancy. It is unknown if this medication is excreted in breast milk. Opioid Counseling: I discussed with the patient the potential side effects of opioids including but not limited to addiction, altered mental status, and depression. I stressed avoiding alcohol, benzodiazepines, muscle relaxants and sleep aids unless specifically okayed by a physician. The patient verbalized understanding of the proper use and possible adverse effects of opioids. All of the patient's questions and concerns were addressed. They were instructed to flush the remaining pills down the toilet if they did not need them for pain. Klisyri Counseling:  I discussed with the patient the risks of Klisyri including but not limited to erythema, scaling, itching, weeping, crusting, and pain. Oxybutynin Counseling:  I discussed with the patient the risks of oxybutynin including but not limited to skin rash, drowsiness, dry mouth, difficulty urinating, and blurred vision. Niacinamide Counseling: I recommended taking niacin or niacinamide, also know as vitamin B3, twice daily. Recent evidence suggests that taking vitamin B3 (500 mg twice daily) can reduce the risk of actinic keratoses and non-melanoma skin cancers. Side effects of vitamin B3 include flushing and headache. Clindamycin Counseling: I counseled the patient regarding use of clindamycin as an antibiotic for prophylactic and/or therapeutic purposes. Clindamycin is active against numerous classes of bacteria, including skin bacteria. Side effects may include nausea, diarrhea, gastrointestinal upset, rash, hives, yeast infections, and in rare cases, colitis. Topical Clindamycin Pregnancy And Lactation Text: This medication is Pregnancy Category B and is considered safe during pregnancy. It is unknown if it is excreted in breast milk. Azathioprine Counseling:  I discussed with the patient the risks of azathioprine including but not limited to myelosuppression, immunosuppression, hepatotoxicity, lymphoma, and infections.  The patient understands that monitoring is required including baseline LFTs, Creatinine, possible TPMP genotyping and weekly CBCs for the first month and then every 2 weeks thereafter.  The patient verbalized understanding of the proper use and possible adverse effects of azathioprine.  All of the patient's questions and concerns were addressed. Mirvaso Counseling: Mirvaso is a topical medication which can decrease superficial blood flow where applied. Side effects are uncommon and include stinging, redness and allergic reactions. Wartpeel Counseling:  I discussed with the patient the risks of Wartpeel including but not limited to erythema, scaling, itching, weeping, crusting, and pain. Cephalexin Pregnancy And Lactation Text: This medication is Pregnancy Category B and considered safe during pregnancy.  It is also excreted in breast milk but can be used safely for shorter doses. Finasteride Male Counseling: Finasteride Counseling:  I discussed with the patient the risks of use of finasteride including but not limited to decreased libido, decreased ejaculate volume, gynecomastia, and depression. Women should not handle medication.  All of the patient's questions and concerns were addressed. Otezla Pregnancy And Lactation Text: This medication is Pregnancy Category C and it isn't known if it is safe during pregnancy. It is unknown if it is excreted in breast milk. Infliximab Counseling:  I discussed with the patient the risks of infliximab including but not limited to myelosuppression, immunosuppression, autoimmune hepatitis, demyelinating diseases, lymphoma, and serious infections.  The patient understands that monitoring is required including a PPD at baseline and must alert us or the primary physician if symptoms of infection or other concerning signs are noted. Rifampin Pregnancy And Lactation Text: This medication is Pregnancy Category C and it isn't know if it is safe during pregnancy. It is also excreted in breast milk and should not be used if you are breast feeding. Glycopyrrolate Counseling:  I discussed with the patient the risks of glycopyrrolate including but not limited to skin rash, drowsiness, dry mouth, difficulty urinating, and blurred vision. Eucrisa Counseling: Patient may experience a mild burning sensation during topical application. Eucrisa is not approved in children less than 2 years of age. Imiquimod Pregnancy And Lactation Text: This medication is Pregnancy Category C. It is unknown if this medication is excreted in breast milk. Propranolol Pregnancy And Lactation Text: This medication is Pregnancy Category C and it isn't known if it is safe during pregnancy. It is excreted in breast milk. Spironolactone Pregnancy And Lactation Text: This medication can cause feminization of the male fetus and should be avoided during pregnancy. The active metabolite is also found in breast milk. Rinvoq Counseling: I discussed with the patient the risks of Rinvoq therapy including but not limited to upper respiratory tract infections, shingles, cold sores, bronchitis, nausea, cough, fever, acne, and headache. Live vaccines should be avoided.  This medication has been linked to serious infections; higher rate of mortality; malignancy and lymphoproliferative disorders; major adverse cardiovascular events; thrombosis; thrombocytopenia, anemia, and neutropenia; lipid elevations; liver enzyme elevations; and gastrointestinal perforations. Use Enhanced Medication Counseling?: No Sarecycline Counseling: Patient advised regarding possible photosensitivity and discoloration of the teeth, skin, lips, tongue and gums.  Patient instructed to avoid sunlight, if possible.  When exposed to sunlight, patients should wear protective clothing, sunglasses, and sunscreen.  The patient was instructed to call the office immediately if the following severe adverse effects occur:  hearing changes, easy bruising/bleeding, severe headache, or vision changes.  The patient verbalized understanding of the proper use and possible adverse effects of sarecycline.  All of the patient's questions and concerns were addressed. Xolair Pregnancy And Lactation Text: This medication is Pregnancy Category B and is considered safe during pregnancy. This medication is excreted in breast milk. Cyclosporine Pregnancy And Lactation Text: This medication is Pregnancy Category C and it isn't know if it is safe during pregnancy. This medication is excreted in breast milk. Tremfya Pregnancy And Lactation Text: The risk during pregnancy and breastfeeding is uncertain with this medication. Gabapentin Counseling: I discussed with the patient the risks of gabapentin including but not limited to dizziness, somnolence, fatigue and ataxia. Isotretinoin Counseling: Patient should get monthly blood tests, not donate blood, not drive at night if vision affected, not share medication, and not undergo elective surgery for 6 months after tx completed. Side effects reviewed, pt to contact office should one occur. Qbrexza Pregnancy And Lactation Text: There is no available data on Qbrexza use in pregnant women.  There is no available data on Qbrexza use in lactation. Ivermectin Counseling:  Patient instructed to take medication on an empty stomach with a full glass of water.  Patient informed of potential adverse effects including but not limited to nausea, diarrhea, dizziness, itching, and swelling of the extremities or lymph nodes.  The patient verbalized understanding of the proper use and possible adverse effects of ivermectin.  All of the patient's questions and concerns were addressed. Birth Control Pills Counseling: Birth Control Pill Counseling: I discussed with the patient the potential side effects of OCPs including but not limited to increased risk of stroke, heart attack, thrombophlebitis, deep venous thrombosis, hepatic adenomas, breast changes, GI upset, headaches, and depression.  The patient verbalized understanding of the proper use and possible adverse effects of OCPs. All of the patient's questions and concerns were addressed. Otezla Counseling: The side effects of Otezla were discussed with the patient, including but not limited to worsening or new depression, weight loss, diarrhea, nausea, upper respiratory tract infection, and headache. Patient instructed to call the office should any adverse effect occur.  The patient verbalized understanding of the proper use and possible adverse effects of Otezla.  All the patient's questions and concerns were addressed. Nsaids Counseling: NSAID Counseling: I discussed with the patient that NSAIDs should be taken with food. Prolonged use of NSAIDs can result in the development of stomach ulcers.  Patient advised to stop taking NSAIDs if abdominal pain occurs.  The patient verbalized understanding of the proper use and possible adverse effects of NSAIDs.  All of the patient's questions and concerns were addressed. Colchicine Counseling:  Patient counseled regarding adverse effects including but not limited to stomach upset (nausea, vomiting, stomach pain, or diarrhea).  Patient instructed to limit alcohol consumption while taking this medication.  Colchicine may reduce blood counts especially with prolonged use.  The patient understands that monitoring of kidney function and blood counts may be required, especially at baseline. The patient verbalized understanding of the proper use and possible adverse effects of colchicine.  All of the patient's questions and concerns were addressed. Acitretin Pregnancy And Lactation Text: This medication is Pregnancy Category X and should not be given to women who are pregnant or may become pregnant in the future. This medication is excreted in breast milk. Doxycycline Counseling:  Patient counseled regarding possible photosensitivity and increased risk for sunburn.  Patient instructed to avoid sunlight, if possible.  When exposed to sunlight, patients should wear protective clothing, sunglasses, and sunscreen.  The patient was instructed to call the office immediately if the following severe adverse effects occur:  hearing changes, easy bruising/bleeding, severe headache, or vision changes.  The patient verbalized understanding of the proper use and possible adverse effects of doxycycline.  All of the patient's questions and concerns were addressed. Doxycycline Pregnancy And Lactation Text: This medication is Pregnancy Category D and not consider safe during pregnancy. It is also excreted in breast milk but is considered safe for shorter treatment courses. Clindamycin Pregnancy And Lactation Text: This medication can be used in pregnancy if certain situations. Clindamycin is also present in breast milk. Solaraze Counseling:  I discussed with the patient the risks of Solaraze including but not limited to erythema, scaling, itching, weeping, crusting, and pain. Xolair Counseling:  Patient informed of potential adverse effects including but not limited to fever, muscle aches, rash and allergic reactions.  The patient verbalized understanding of the proper use and possible adverse effects of Xolair.  All of the patient's questions and concerns were addressed. Bexarotene Pregnancy And Lactation Text: This medication is Pregnancy Category X and should not be given to women who are pregnant or may become pregnant. This medication should not be used if you are breast feeding. Doxepin Counseling:  Patient advised that the medication is sedating and not to drive a car after taking this medication. Patient informed of potential adverse effects including but not limited to dry mouth, urinary retention, and blurry vision.  The patient verbalized understanding of the proper use and possible adverse effects of doxepin.  All of the patient's questions and concerns were addressed. Erythromycin Pregnancy And Lactation Text: This medication is Pregnancy Category B and is considered safe during pregnancy. It is also excreted in breast milk. Arava Counseling:  Patient counseled regarding adverse effects of Arava including but not limited to nausea, vomiting, abnormalities in liver function tests. Patients may develop mouth sores, rash, diarrhea, and abnormalities in blood counts. The patient understands that monitoring is required including LFTs and blood counts.  There is a rare possibility of scarring of the liver and lung problems that can occur when taking methotrexate. Persistent nausea, loss of appetite, pale stools, dark urine, cough, and shortness of breath should be reported immediately. Patient advised to discontinue Arava treatment and consult with a physician prior to attempting conception. The patient will have to undergo a treatment to eliminate Arava from the body prior to conception. Cibinqo Pregnancy And Lactation Text: It is unknown if this medication will adversely affect pregnancy or breast feeding.  You should not take this medication if you are currently pregnant or planning a pregnancy or while breastfeeding. Carac Counseling:  I discussed with the patient the risks of Carac including but not limited to erythema, scaling, itching, weeping, crusting, and pain. Rituxan Counseling:  I discussed with the patient the risks of Rituxan infusions. Side effects can include infusion reactions, severe drug rashes including mucocutaneous reactions, reactivation of latent hepatitis and other infections and rarely progressive multifocal leukoencephalopathy.  All of the patient's questions and concerns were addressed. Griseofulvin Counseling:  I discussed with the patient the risks of griseofulvin including but not limited to photosensitivity, cytopenia, liver damage, nausea/vomiting and severe allergy.  The patient understands that this medication is best absorbed when taken with a fatty meal (e.g., ice cream or french fries). Tetracycline Counseling: Patient counseled regarding possible photosensitivity and increased risk for sunburn.  Patient instructed to avoid sunlight, if possible.  When exposed to sunlight, patients should wear protective clothing, sunglasses, and sunscreen.  The patient was instructed to call the office immediately if the following severe adverse effects occur:  hearing changes, easy bruising/bleeding, severe headache, or vision changes.  The patient verbalized understanding of the proper use and possible adverse effects of tetracycline.  All of the patient's questions and concerns were addressed. Patient understands to avoid pregnancy while on therapy due to potential birth defects. Benzoyl Peroxide Pregnancy And Lactation Text: This medication is Pregnancy Category C. It is unknown if benzoyl peroxide is excreted in breast milk. Tazorac Pregnancy And Lactation Text: This medication is not safe during pregnancy. It is unknown if this medication is excreted in breast milk. Elidel Counseling: Patient may experience a mild burning sensation during topical application. Elidel is not approved in children less than 2 years of age. There have been case reports of hematologic and skin malignancies in patients using topical calcineurin inhibitors although causality is questionable. Itraconazole Counseling:  I discussed with the patient the risks of itraconazole including but not limited to liver damage, nausea/vomiting, neuropathy, and severe allergy.  The patient understands that this medication is best absorbed when taken with acidic beverages such as non-diet cola or ginger ale.  The patient understands that monitoring is required including baseline LFTs and repeat LFTs at intervals.  The patient understands that they are to contact us or the primary physician if concerning signs are noted. Dutasteride Pregnancy And Lactation Text: This medication is absolutely contraindicated in women, especially during pregnancy and breast feeding. Feminization of male fetuses is possible if taking while pregnant. Zyclara Counseling:  I discussed with the patient the risks of imiquimod including but not limited to erythema, scaling, itching, weeping, crusting, and pain.  Patient understands that the inflammatory response to imiquimod is variable from person to person and was educated regarded proper titration schedule.  If flu-like symptoms develop, patient knows to discontinue the medication and contact us. Albendazole Counseling:  I discussed with the patient the risks of albendazole including but not limited to cytopenia, kidney damage, nausea/vomiting and severe allergy.  The patient understands that this medication is being used in an off-label manner. Ivermectin Pregnancy And Lactation Text: This medication is Pregnancy Category C and it isn't known if it is safe during pregnancy. It is also excreted in breast milk. Clofazimine Pregnancy And Lactation Text: This medication is Pregnancy Category C and isn't considered safe during pregnancy. It is excreted in breast milk. Finasteride Pregnancy And Lactation Text: This medication is absolutely contraindicated during pregnancy. It is unknown if it is excreted in breast milk. Erythromycin Counseling:  I discussed with the patient the risks of erythromycin including but not limited to GI upset, allergic reaction, drug rash, diarrhea, increase in liver enzymes, and yeast infections. Azithromycin Pregnancy And Lactation Text: This medication is considered safe during pregnancy and is also secreted in breast milk. Propranolol Counseling:  I discussed with the patient the risks of propranolol including but not limited to low heart rate, low blood pressure, low blood sugar, restlessness and increased cold sensitivity. They should call the office if they experience any of these side effects. Nsaids Pregnancy And Lactation Text: These medications are considered safe up to 30 weeks gestation. It is excreted in breast milk. Cimetidine Counseling:  I discussed with the patient the risks of Cimetidine including but not limited to gynecomastia, headache, diarrhea, nausea, drowsiness, arrhythmias, pancreatitis, skin rashes, psychosis, bone marrow suppression and kidney toxicity. Ketoconazole Counseling:   Patient counseled regarding improving absorption with orange juice.  Adverse effects include but are not limited to breast enlargement, headache, diarrhea, nausea, upset stomach, liver function test abnormalities, taste disturbance, and stomach pain.  There is a rare possibility of liver failure that can occur when taking ketoconazole. The patient understands that monitoring of LFTs may be required, especially at baseline. The patient verbalized understanding of the proper use and possible adverse effects of ketoconazole.  All of the patient's questions and concerns were addressed. Fluconazole Pregnancy And Lactation Text: This medication is Pregnancy Category C and it isn't know if it is safe during pregnancy. It is also excreted in breast milk. Humira Counseling:  I discussed with the patient the risks of adalimumab including but not limited to myelosuppression, immunosuppression, autoimmune hepatitis, demyelinating diseases, lymphoma, and serious infections.  The patient understands that monitoring is required including a PPD at baseline and must alert us or the primary physician if symptoms of infection or other concerning signs are noted. Protopic Pregnancy And Lactation Text: This medication is Pregnancy Category C. It is unknown if this medication is excreted in breast milk when applied topically. Clofazimine Counseling:  I discussed with the patient the risks of clofazimine including but not limited to skin and eye pigmentation, liver damage, nausea/vomiting, gastrointestinal bleeding and allergy. Libtayo Pregnancy And Lactation Text: This medication is contraindicated in pregnancy and when breast feeding. Rhofade Counseling: Rhofade is a topical medication which can decrease superficial blood flow where applied. Side effects are uncommon and include stinging, redness and allergic reactions. Doxepin Pregnancy And Lactation Text: This medication is Pregnancy Category C and it isn't known if it is safe during pregnancy. It is also excreted in breast milk and breast feeding isn't recommended. Birth Control Pills Pregnancy And Lactation Text: This medication should be avoided if pregnant and for the first 30 days post-partum. Spironolactone Counseling: Patient advised regarding risks of diarrhea, abdominal pain, hyperkalemia, birth defects (for female patients), liver toxicity and renal toxicity. The patient may need blood work to monitor liver and kidney function and potassium levels while on therapy. The patient verbalized understanding of the proper use and possible adverse effects of spironolactone.  All of the patient's questions and concerns were addressed. 5-Fu Pregnancy And Lactation Text: This medication is Pregnancy Category X and contraindicated in pregnancy and in women who may become pregnant. It is unknown if this medication is excreted in breast milk. Methotrexate Counseling:  Patient counseled regarding adverse effects of methotrexate including but not limited to nausea, vomiting, abnormalities in liver function tests. Patients may develop mouth sores, rash, diarrhea, and abnormalities in blood counts. The patient understands that monitoring is required including LFT's and blood counts.  There is a rare possibility of scarring of the liver and lung problems that can occur when taking methotrexate. Persistent nausea, loss of appetite, pale stools, dark urine, cough, and shortness of breath should be reported immediately. Patient advised to discontinue methotrexate treatment at least three months before attempting to become pregnant.  I discussed the need for folate supplements while taking methotrexate.  These supplements can decrease side effects during methotrexate treatment. The patient verbalized understanding of the proper use and possible adverse effects of methotrexate.  All of the patient's questions and concerns were addressed. Calcipotriene Pregnancy And Lactation Text: This medication has not been proven safe during pregnancy. It is unknown if this medication is excreted in breast milk. Skyrizi Counseling: I discussed with the patient the risks of risankizumab-rzaa including but not limited to immunosuppression, and serious infections.  The patient understands that monitoring is required including a PPD at baseline and must alert us or the primary physician if symptoms of infection or other concerning signs are noted. Terbinafine Pregnancy And Lactation Text: This medication is Pregnancy Category B and is considered safe during pregnancy. It is also excreted in breast milk and breast feeding isn't recommended. Metronidazole Counseling:  I discussed with the patient the risks of metronidazole including but not limited to seizures, nausea/vomiting, a metallic taste in the mouth, nausea/vomiting and severe allergy. Odomzo Counseling- I discussed with the patient the risks of Odomzo including but not limited to nausea, vomiting, diarrhea, constipation, weight loss, changes in the sense of taste, decreased appetite, muscle spasms, and hair loss.  The patient verbalized understanding of the proper use and possible adverse effects of Odomzo.  All of the patient's questions and concerns were addressed. Hydroquinone Pregnancy And Lactation Text: This medication has not been assigned a Pregnancy Risk Category but animal studies failed to show danger with the topical medication. It is unknown if the medication is excreted in breast milk. Acitretin Counseling:  I discussed with the patient the risks of acitretin including but not limited to hair loss, dry lips/skin/eyes, liver damage, hyperlipidemia, depression/suicidal ideation, photosensitivity.  Serious rare side effects can include but are not limited to pancreatitis, pseudotumor cerebri, bony changes, clot formation/stroke/heart attack.  Patient understands that alcohol is contraindicated since it can result in liver toxicity and significantly prolong the elimination of the drug by many years. 5-Fu Counseling: 5-Fluorouracil Counseling:  I discussed with the patient the risks of 5-fluorouracil including but not limited to erythema, scaling, itching, weeping, crusting, and pain. Siliq Counseling:  I discussed with the patient the risks of Siliq including but not limited to new or worsening depression, suicidal thoughts and behavior, immunosuppression, malignancy, posterior leukoencephalopathy syndrome, and serious infections.  The patient understands that monitoring is required including a PPD at baseline and must alert us or the primary physician if symptoms of infection or other concerning signs are noted. There is also a special program designed to monitor depression which is required with Siliq. Glycopyrrolate Pregnancy And Lactation Text: This medication is Pregnancy Category B and is considered safe during pregnancy. It is unknown if it is excreted breast milk. Dupixent Pregnancy And Lactation Text: This medication likely crosses the placenta but the risk for the fetus is uncertain. This medication is excreted in breast milk. Libtayo Counseling- I discussed with the patient the risks of Libtayo including but not limited to nausea, vomiting, diarrhea, and bone or muscle pain.  The patient verbalized understanding of the proper use and possible adverse effects of Libtayo.  All of the patient's questions and concerns were addressed. Isotretinoin Pregnancy And Lactation Text: This medication is Pregnancy Category X and is considered extremely dangerous during pregnancy. It is unknown if it is excreted in breast milk. Adbry Pregnancy And Lactation Text: It is unknown if this medication will adversely affect pregnancy or breast feeding. Adbry Counseling: I discussed with the patient the risks of tralokinumab including but not limited to eye infection and irritation, cold sores, injection site reactions, worsening of asthma, allergic reactions and increased risk of parasitic infection.  Live vaccines should be avoided while taking tralokinumab. The patient understands that monitoring is required and they must alert us or the primary physician if symptoms of infection or other concerning signs are noted. Hydroxyzine Pregnancy And Lactation Text: This medication is not safe during pregnancy and should not be taken. It is also excreted in breast milk and breast feeding isn't recommended. Cantharidin Pregnancy And Lactation Text: The use of this medication during pregnancy or lactation is not recommended as there is insufficient data. Xelrebeccaz Pregnancy And Lactation Text: This medication is Pregnancy Category D and is not considered safe during pregnancy.  The risk during breast feeding is also uncertain. Cellcept Pregnancy And Lactation Text: This medication is Pregnancy Category D and isn't considered safe during pregnancy. It is unknown if this medication is excreted in breast milk. Hydroxyzine Counseling: Patient advised that the medication is sedating and not to drive a car after taking this medication.  Patient informed of potential adverse effects including but not limited to dry mouth, urinary retention, and blurry vision.  The patient verbalized understanding of the proper use and possible adverse effects of hydroxyzine.  All of the patient's questions and concerns were addressed. Cimzia Counseling:  I discussed with the patient the risks of Cimzia including but not limited to immunosuppression, allergic reactions and infections.  The patient understands that monitoring is required including a PPD at baseline and must alert us or the primary physician if symptoms of infection or other concerning signs are noted. Tazorac Counseling:  Patient advised that medication is irritating and drying.  Patient may need to apply sparingly and wash off after an hour before eventually leaving it on overnight.  The patient verbalized understanding of the proper use and possible adverse effects of tazorac.  All of the patient's questions and concerns were addressed. Opioid Pregnancy And Lactation Text: These medications can lead to premature delivery and should be avoided during pregnancy. These medications are also present in breast milk in small amounts. Topical Clindamycin Counseling: Patient counseled that this medication may cause skin irritation or allergic reactions.  In the event of skin irritation, the patient was advised to reduce the amount of the drug applied or use it less frequently.   The patient verbalized understanding of the proper use and possible adverse effects of clindamycin.  All of the patient's questions and concerns were addressed. Cosentyx Counseling:  I discussed with the patient the risks of Cosentyx including but not limited to worsening of Crohn's disease, immunosuppression, allergic reactions and infections.  The patient understands that monitoring is required including a PPD at baseline and must alert us or the primary physician if symptoms of infection or other concerning signs are noted. Cyclophosphamide Pregnancy And Lactation Text: This medication is Pregnancy Category D and it isn't considered safe during pregnancy. This medication is excreted in breast milk. Cimzia Pregnancy And Lactation Text: This medication crosses the placenta but can be considered safe in certain situations. Cimzia may be excreted in breast milk. Drysol Counseling:  I discussed with the patient the risks of drysol/aluminum chloride including but not limited to skin rash, itching, irritation, burning. Valtrex Counseling: I discussed with the patient the risks of valacyclovir including but not limited to kidney damage, nausea, vomiting and severe allergy.  The patient understands that if the infection seems to be worsening or is not improving, they are to call. Cyclophosphamide Counseling:  I discussed with the patient the risks of cyclophosphamide including but not limited to hair loss, hormonal abnormalities, decreased fertility, abdominal pain, diarrhea, nausea and vomiting, bone marrow suppression and infection. The patient understands that monitoring is required while taking this medication. Protopic Counseling: Patient may experience a mild burning sensation during topical application. Protopic is not approved in children less than 2 years of age. There have been case reports of hematologic and skin malignancies in patients using topical calcineurin inhibitors although causality is questionable. Valtrex Pregnancy And Lactation Text: this medication is Pregnancy Category B and is considered safe during pregnancy. This medication is not directly found in breast milk but it's metabolite acyclovir is present. Enbrel Counseling:  I discussed with the patient the risks of etanercept including but not limited to myelosuppression, immunosuppression, autoimmune hepatitis, demyelinating diseases, lymphoma, and infections.  The patient understands that monitoring is required including a PPD at baseline and must alert us or the primary physician if symptoms of infection or other concerning signs are noted. Oral Minoxidil Counseling- I discussed with the patient the risks of oral minoxidil including but not limited to shortness of breath, swelling of the feet or ankles, dizziness, lightheadedness, unwanted hair growth and allergic reaction.  The patient verbalized understanding of the proper use and possible adverse effects of oral minoxidil.  All of the patient's questions and concerns were addressed. Ilumya Counseling: I discussed with the patient the risks of tildrakizumab including but not limited to immunosuppression, malignancy, posterior leukoencephalopathy syndrome, and serious infections.  The patient understands that monitoring is required including a PPD at baseline and must alert us or the primary physician if symptoms of infection or other concerning signs are noted. Niacinamide Pregnancy And Lactation Text: These medications are considered safe during pregnancy. Quinolones Counseling:  I discussed with the patient the risks of fluoroquinolones including but not limited to GI upset, allergic reaction, drug rash, diarrhea, dizziness, photosensitivity, yeast infections, liver function test abnormalities, tendonitis/tendon rupture. Drysol Pregnancy And Lactation Text: This medication is considered safe during pregnancy and breast feeding. SSKI Counseling:  I discussed with the patient the risks of SSKI including but not limited to thyroid abnormalities, metallic taste, GI upset, fever, headache, acne, arthralgias, paraesthesias, lymphadenopathy, easy bleeding, arrhythmias, and allergic reaction. Tranexamic Acid Pregnancy And Lactation Text: It is unknown if this medication is safe during pregnancy or breast feeding. Winlevi Pregnancy And Lactation Text: This medication is considered safe during pregnancy and breastfeeding. Opzelura Counseling:  I discussed with the patient the risks of Opzelura including but not limited to nasopharngitis, bronchitis, ear infection, eosinophila, hives, diarrhea, folliculitis, tonsillitis, and rhinorrhea.  Taken orally, this medication has been linked to serious infections; higher rate of mortality; malignancy and lymphoproliferative disorders; major adverse cardiovascular events; thrombosis; thrombocytopenia, anemia, and neutropenia; and lipid elevations. Qbrexza Counseling:  I discussed with the patient the risks of Qbrexza including but not limited to headache, mydriasis, blurred vision, dry eyes, nasal dryness, dry mouth, dry throat, dry skin, urinary hesitation, and constipation.  Local skin reactions including erythema, burning, stinging, and itching can also occur. Solaraze Pregnancy And Lactation Text: This medication is Pregnancy Category B and is considered safe. There is some data to suggest avoiding during the third trimester. It is unknown if this medication is excreted in breast milk. Cyclosporine Counseling:  I discussed with the patient the risks of cyclosporine including but not limited to hypertension, gingival hyperplasia,myelosuppression, immunosuppression, liver damage, kidney damage, neurotoxicity, lymphoma, and serious infections. The patient understands that monitoring is required including baseline blood pressure, CBC, CMP, lipid panel and uric acid, and then 1-2 times monthly CMP and blood pressure. Topical Retinoid counseling:  Patient advised to apply a pea-sized amount only at bedtime and wait 30 minutes after washing their face before applying.  If too drying, patient may add a non-comedogenic moisturizer. The patient verbalized understanding of the proper use and possible adverse effects of retinoids.  All of the patient's questions and concerns were addressed. Azelaic Acid Counseling: Patient counseled that medicine may cause skin irritation and to avoid applying near the eyes.  In the event of skin irritation, the patient was advised to reduce the amount of the drug applied or use it less frequently.   The patient verbalized understanding of the proper use and possible adverse effects of azelaic acid.  All of the patient's questions and concerns were addressed. Bexarotene Counseling:  I discussed with the patient the risks of bexarotene including but not limited to hair loss, dry lips/skin/eyes, liver abnormalities, hyperlipidemia, pancreatitis, depression/suicidal ideation, photosensitivity, drug rash/allergic reactions, hypothyroidism, anemia, leukopenia, infection, cataracts, and teratogenicity.  Patient understands that they will need regular blood tests to check lipid profile, liver function tests, white blood cell count, thyroid function tests and pregnancy test if applicable. Hydroxychloroquine Pregnancy And Lactation Text: This medication has been shown to cause fetal harm but it isn't assigned a Pregnancy Risk Category. There are small amounts excreted in breast milk. Detail Level: Simple Minoxidil Counseling: Minoxidil is a topical medication which can increase blood flow where it is applied. It is uncertain how this medication increases hair growth. Side effects are uncommon and include stinging and allergic reactions. Tranexamic Acid Counseling:  Patient advised of the small risk of bleeding problems with tranexamic acid. They were also instructed to call if they developed any nausea, vomiting or diarrhea. All of the patient's questions and concerns were addressed. Stelara Counseling:  I discussed with the patient the risks of ustekinumab including but not limited to immunosuppression, malignancy, posterior leukoencephalopathy syndrome, and serious infections.  The patient understands that monitoring is required including a PPD at baseline and must alert us or the primary physician if symptoms of infection or other concerning signs are noted. Rinvoq Pregnancy And Lactation Text: Based on animal studies, Rinvoq may cause embryo-fetal harm when administered to pregnant women.  The medication should not be used in pregnancy.  Breastfeeding is not recommended during treatment and for 6 days after the last dose. Topical Sulfur Applications Counseling: Topical Sulfur Counseling: Patient counseled that this medication may cause skin irritation or allergic reactions.  In the event of skin irritation, the patient was advised to reduce the amount of the drug applied or use it less frequently.   The patient verbalized understanding of the proper use and possible adverse effects of topical sulfur application.  All of the patient's questions and concerns were addressed. Benzoyl Peroxide Counseling: Patient counseled that medicine may cause skin irritation and bleach clothing.  In the event of skin irritation, the patient was advised to reduce the amount of the drug applied or use it less frequently.   The patient verbalized understanding of the proper use and possible adverse effects of benzoyl peroxide.  All of the patient's questions and concerns were addressed. Azithromycin Counseling:  I discussed with the patient the risks of azithromycin including but not limited to GI upset, allergic reaction, drug rash, diarrhea, and yeast infections. Rituxan Pregnancy And Lactation Text: This medication is Pregnancy Category C and it isn't know if it is safe during pregnancy. It is unknown if this medication is excreted in breast milk but similar antibodies are known to be excreted. Dapsone Pregnancy And Lactation Text: This medication is Pregnancy Category C and is not considered safe during pregnancy or breast feeding. Topical Sulfur Applications Pregnancy And Lactation Text: This medication is Pregnancy Category C and has an unknown safety profile during pregnancy. It is unknown if this topical medication is excreted in breast milk. Thalidomide Counseling: I discussed with the patient the risks of thalidomide including but not limited to birth defects, anxiety, weakness, chest pain, dizziness, cough and severe allergy. Dapsone Counseling: I discussed with the patient the risks of dapsone including but not limited to hemolytic anemia, agranulocytosis, rashes, methemoglobinemia, kidney failure, peripheral neuropathy, headaches, GI upset, and liver toxicity.  Patients who start dapsone require monitoring including baseline LFTs and weekly CBCs for the first month, then every month thereafter.  The patient verbalized understanding of the proper use and possible adverse effects of dapsone.  All of the patient's questions and concerns were addressed. Imiquimod Counseling:  I discussed with the patient the risks of imiquimod including but not limited to erythema, scaling, itching, weeping, crusting, and pain.  Patient understands that the inflammatory response to imiquimod is variable from person to person and was educated regarded proper titration schedule.  If flu-like symptoms develop, patient knows to discontinue the medication and contact us. Metronidazole Pregnancy And Lactation Text: This medication is Pregnancy Category B and considered safe during pregnancy.  It is also excreted in breast milk. Simponi Counseling:  I discussed with the patient the risks of golimumab including but not limited to myelosuppression, immunosuppression, autoimmune hepatitis, demyelinating diseases, lymphoma, and serious infections.  The patient understands that monitoring is required including a PPD at baseline and must alert us or the primary physician if symptoms of infection or other concerning signs are noted. Bactrim Counseling:  I discussed with the patient the risks of sulfa antibiotics including but not limited to GI upset, allergic reaction, drug rash, diarrhea, dizziness, photosensitivity, and yeast infections.  Rarely, more serious reactions can occur including but not limited to aplastic anemia, agranulocytosis, methemoglobinemia, blood dyscrasias, liver or kidney failure, lung infiltrates or desquamative/blistering drug rashes. Winlevi Counseling:  I discussed with the patient the risks of topical clascoterone including but not limited to erythema, scaling, itching, and stinging. Patient voiced their understanding. Griseofulvin Pregnancy And Lactation Text: This medication is Pregnancy Category X and is known to cause serious birth defects. It is unknown if this medication is excreted in breast milk but breast feeding should be avoided. Dupixent Counseling: I discussed with the patient the risks of dupilumab including but not limited to eye infection and irritation, cold sores, injection site reactions, worsening of asthma, allergic reactions and increased risk of parasitic infection.  Live vaccines should be avoided while taking dupilumab. Dupilumab will also interact with certain medications such as warfarin and cyclosporine. The patient understands that monitoring is required and they must alert us or the primary physician if symptoms of infection or other concerning signs are noted. Ketoconazole Pregnancy And Lactation Text: This medication is Pregnancy Category C and it isn't know if it is safe during pregnancy. It is also excreted in breast milk and breast feeding isn't recommended. Erivedge Counseling- I discussed with the patient the risks of Erivedge including but not limited to nausea, vomiting, diarrhea, constipation, weight loss, changes in the sense of taste, decreased appetite, muscle spasms, and hair loss.  The patient verbalized understanding of the proper use and possible adverse effects of Erivedge.  All of the patient's questions and concerns were addressed. Calcipotriene Counseling:  I discussed with the patient the risks of calcipotriene including but not limited to erythema, scaling, itching, and irritation. Sski Pregnancy And Lactation Text: This medication is Pregnancy Category D and isn't considered safe during pregnancy. It is excreted in breast milk. Cibinqo Counseling: I discussed with the patient the risks of Cibinqo therapy including but not limited to common cold, nausea, headache, cold sores, increased blood CPK levels, dizziness, UTIs, fatigue, acne, and vomitting. Live vaccines should be avoided.  This medication has been linked to serious infections; higher rate of mortality; malignancy and lymphoproliferative disorders; major adverse cardiovascular events; thrombosis; thrombocytopenia and lymphopenia; lipid elevations; and retinal detachment. Xeljanz Counseling: I discussed with the patient the risks of Xeljanz therapy including increased risk of infection, liver issues, headache, diarrhea, or cold symptoms. Live vaccines should be avoided. They were instructed to call if they have any problems. Klisyri Pregnancy And Lactation Text: It is unknown if this medication can harm a developing fetus or if it is excreted in breast milk. Minocycline Counseling: Patient advised regarding possible photosensitivity and discoloration of the teeth, skin, lips, tongue and gums.  Patient instructed to avoid sunlight, if possible.  When exposed to sunlight, patients should wear protective clothing, sunglasses, and sunscreen.  The patient was instructed to call the office immediately if the following severe adverse effects occur:  hearing changes, easy bruising/bleeding, severe headache, or vision changes.  The patient verbalized understanding of the proper use and possible adverse effects of minocycline.  All of the patient's questions and concerns were addressed. Methotrexate Pregnancy And Lactation Text: This medication is Pregnancy Category X and is known to cause fetal harm. This medication is excreted in breast milk. Cellcept Counseling:  I discussed with the patient the risks of mycophenolate mofetil including but not limited to infection/immunosuppression, GI upset, hypokalemia, hypercholesterolemia, bone marrow suppression, lymphoproliferative disorders, malignancy, GI ulceration/bleed/perforation, colitis, interstitial lung disease, kidney failure, progressive multifocal leukoencephalopathy, and birth defects.  The patient understands that monitoring is required including a baseline creatinine and regular CBC testing. In addition, patient must alert us immediately if symptoms of infection or other concerning signs are noted. Bactrim Pregnancy And Lactation Text: This medication is Pregnancy Category D and is known to cause fetal risk.  It is also excreted in breast milk.

## 2022-10-24 NOTE — ED ADULT NURSE NOTE - RESPIRATORY WDL
Breathing spontaneous and unlabored. Breath sounds clear and equal bilaterally with regular rhythm. DC instructions

## 2023-03-28 NOTE — ED PROVIDER NOTE - EKG ADDITIONAL INFORMATION FREE TEXT
see above Rituxan Pregnancy And Lactation Text: This medication is Pregnancy Category C and it isn't know if it is safe during pregnancy. It is unknown if this medication is excreted in breast milk but similar antibodies are known to be excreted.

## 2023-08-24 ENCOUNTER — EMERGENCY (EMERGENCY)
Facility: HOSPITAL | Age: 73
LOS: 1 days | Discharge: HOME | End: 2023-08-24
Admitting: EMERGENCY MEDICINE
Payer: MEDICARE

## 2023-08-24 VITALS
WEIGHT: 177.03 LBS | HEIGHT: 60 IN | SYSTOLIC BLOOD PRESSURE: 161 MMHG | OXYGEN SATURATION: 96 % | TEMPERATURE: 99 F | RESPIRATION RATE: 16 BRPM | HEART RATE: 89 BPM | DIASTOLIC BLOOD PRESSURE: 88 MMHG

## 2023-08-24 DIAGNOSIS — Z88.2 ALLERGY STATUS TO SULFONAMIDES: ICD-10-CM

## 2023-08-24 DIAGNOSIS — R05.2 SUBACUTE COUGH: ICD-10-CM

## 2023-08-24 DIAGNOSIS — Z91.013 ALLERGY TO SEAFOOD: ICD-10-CM

## 2023-08-24 DIAGNOSIS — I10 ESSENTIAL (PRIMARY) HYPERTENSION: ICD-10-CM

## 2023-08-24 DIAGNOSIS — R05.9 COUGH, UNSPECIFIED: ICD-10-CM

## 2023-08-24 DIAGNOSIS — E78.5 HYPERLIPIDEMIA, UNSPECIFIED: ICD-10-CM

## 2023-08-24 DIAGNOSIS — E03.9 HYPOTHYROIDISM, UNSPECIFIED: ICD-10-CM

## 2023-08-24 PROCEDURE — 71045 X-RAY EXAM CHEST 1 VIEW: CPT | Mod: 26

## 2023-08-24 PROCEDURE — 99284 EMERGENCY DEPT VISIT MOD MDM: CPT

## 2023-08-24 NOTE — ED PROVIDER NOTE - OBJECTIVE STATEMENT
72 y/o female here with cough for several days. Endorses some sputum production. Patient appears well NAd stable. Denies fever, chills, hematuria, vaginal bleeding, d/c, abdominal pain, change in bowel function, flank pain, rash, HA, dizziness, SOB, CP, palpitations, diaphoresis,, and malaise.

## 2023-08-24 NOTE — ED ADULT NURSE NOTE - OBJECTIVE STATEMENT
Pt to ED for cough for 3 days, no other flu symptoms. Concerned for pneumonia, requesting chest xray. Provider at bedside to assess.

## 2023-08-24 NOTE — ED ADULT NURSE NOTE - CAS DISCH TRANSFER METHOD
[facility] -   Obstetrics  Labor History and Physical    Patient Name: Shruthi Riley  MRN: [id number]  Admission Date: 2020  Hospital Length of Stay: 0 days  Attending Physician: Terri Bueno MD  Primary Care Provider: Primary Doctor No    Subjective:     Principal Problem:Encounter for planned induction of labor    Hospital Course:  20 0930 Admit for labor, Pitocin per protocol, epidural PRN    Obstetric HPI:  This pregnancy has been complicated by:  -Anemia  -History of IUFD at 26 weeks  -Chronic Hepatitis C  -History of substance abuse    OB History    Para Term  AB Living   5 4 2 2 0 3   SAB TAB Ectopic Multiple Live Births   0 0 0 0 3      # Outcome Date GA Lbr Kris/2nd Weight Sex Delivery Anes PTL Lv   5 Current            4 Term 18 40w3d  4.082 kg (9 lb) F Vag-Spont EPI N JEN      Name: JEY RILEY      Apgar1: 8  Apgar5: 9   3  01/01/15 26w0d   U    FD      Birth Comments: IUFD unk etiology   2 Term 10/10/13 40w0d  1.814 kg (4 lb) M Vag-Spont EPI  JEN      Birth Comments: pt does not have custoday-live in [place]   1  02/21/10 32w0d  3.289 kg (7 lb 4 oz) M Vag-Spont EPI  JEN      Birth Comments: pt does not have custoday-live in [place]     Past Medical History:   Diagnosis Date    Hepatitis C     History of trichomoniasis     Miscarriage      Past Surgical History:   Procedure Laterality Date    DILATION AND CURETTAGE OF UTERUS      TONSILLECTOMY         PTA Medications   Medication Sig    ferrous sulfate (FEOSOL) 325 mg (65 mg iron) Tab tablet Take 1 tablet (325 mg total) by mouth once daily.    prenatal vit calc,iron,folic (PRENATAL VITAMIN ORAL) Take 1 tablet by mouth once daily.    valACYclovir (VALTREX) 500 MG tablet Take 1 tablet (500 mg total) by mouth once daily.    amoxicillin (AMOXIL) 500 MG capsule Take 1 capsule (500 mg total) by mouth every 8 (eight) hours. (Patient not taking: Reported on 9/3/2020)       Review of  patient's allergies indicates:  No Known Allergies     Family History     Problem Relation (Age of Onset)    Heart defect Mother        Tobacco Use    Smoking status: Current Every Day Smoker     Packs/day: 0.50     Types: Cigarettes    Smokeless tobacco: Never Used   Substance and Sexual Activity    Alcohol use: No    Drug use: Not Currently     Comment: polysubstance abuser     Sexual activity: Yes     Partners: Male     Birth control/protection: None     Review of Systems   Gastrointestinal: Negative for abdominal pain, nausea and vomiting.   Genitourinary: Negative for vaginal bleeding and vaginal discharge.   All other systems reviewed and are negative.     Objective:     Vital Signs (Most Recent):  Pulse: 77 (09/11/20 0715)  BP: 112/69 (09/11/20 0715)  SpO2: 99 % (09/11/20 0715) Vital Signs (24h Range):  Pulse:  [76-93] 77  SpO2:  [98 %-100 %] 99 %  BP: (111-120)/(64-69) 112/69     Weight: 90.7 kg (200 lb)  Body mass index is 29.53 kg/m².    FHT: Cat 1 (reassuring) baseline 135, moderate variability, 15x15 accels, no decelerations  TOCO:  Q 3-5 minutes    Physical Exam:   Constitutional: She is oriented to person, place, and time. She appears well-developed and well-nourished.      Neck: Normal range of motion.    Cardiovascular: Regular rhythm.     Pulmonary/Chest: Effort normal.        Abdominal: Soft.   Gravid, EFW 7.5 lbs     Genitourinary:    Vagina normal.             Musculoskeletal: Normal range of motion.       Neurological: She is alert and oriented to person, place, and time.    Skin: Skin is warm and dry.    Psychiatric: She has a normal mood and affect. Her behavior is normal.       Cervix:  Dilation:  5  Effacement:  75%  Station: -2  Presentation: Vertex     Significant Labs:  Lab Results   Component Value Date    GROUPTRH O POS 09/11/2020    HEPBSAG Negative 01/27/2020    STREPBCULT No Group B Streptococcus isolated 08/23/2020       I have personallly reviewed all pertinent lab results  from the last 24 hours.    Assessment/Plan:     27 y.o. female  at 39w0d for:    * Encounter for planned induction of labor  Pitocin induction  AROM  Epidural PRN  Close monitoring of maternal/fetal status  Anticipate normal vaginal delivery    Anemia during pregnancy in second trimester  CBC on admit    History of IUFD  IOL per guidelines    HSV-2 (herpes simplex virus 2) infection  Taking Valtrex for suppression    History of substance abuse  UDS negative 2 weeks ago    Chronic hepatitis C without hepatic coma  Followed by hepatology        LILI Beckman CNM  Obstetrics  [facility]             [place]

## 2023-08-24 NOTE — ED PROVIDER NOTE - CLINICAL SUMMARY MEDICAL DECISION MAKING FREE TEXT BOX
Patient here with cough x several days. Appears well NAD.   Exam unremarkable   Plan : XR r/o pna    XR negative wet read.     d/c with return precautions and symptomatic treatment

## 2023-08-24 NOTE — ED PROVIDER NOTE - PATIENT PORTAL LINK FT
You can access the FollowMyHealth Patient Portal offered by [facility] by registering at the following website: [email or web address]. By joining [employer]’s FollowMyHealth portal, you will also be able to view your health information using other applications (apps) compatible with our system.

## 2024-04-30 NOTE — ED ADULT NURSE NOTE - SUICIDE SCREENING QUESTION 1
Access following regarding ETOH: chart reviewed. Last CIWA score of 8. Anxiety overnight. No behavioral issues noted overnight. Will follow.    No

## 2024-09-28 ENCOUNTER — EMERGENCY (EMERGENCY)
Facility: HOSPITAL | Age: 74
LOS: 1 days | Discharge: HOME | End: 2024-09-28
Admitting: EMERGENCY MEDICINE
Payer: MEDICARE

## 2024-09-28 VITALS
OXYGEN SATURATION: 98 % | HEART RATE: 88 BPM | RESPIRATION RATE: 16 BRPM | SYSTOLIC BLOOD PRESSURE: 137 MMHG | DIASTOLIC BLOOD PRESSURE: 77 MMHG | TEMPERATURE: 98 F

## 2024-09-28 LAB
ANION GAP SERPL CALC-SCNC: 10 MMOL/L — SIGNIFICANT CHANGE UP (ref 9–16)
BASOPHILS # BLD AUTO: 0.07 K/UL — SIGNIFICANT CHANGE UP (ref 0–0.2)
BASOPHILS NFR BLD AUTO: 0.6 % — SIGNIFICANT CHANGE UP (ref 0–2)
BUN SERPL-MCNC: 23 MG/DL — SIGNIFICANT CHANGE UP (ref 7–23)
CALCIUM SERPL-MCNC: 9.1 MG/DL — SIGNIFICANT CHANGE UP (ref 8.5–10.5)
CHLORIDE SERPL-SCNC: 99 MMOL/L — SIGNIFICANT CHANGE UP (ref 96–108)
CO2 SERPL-SCNC: 25 MMOL/L — SIGNIFICANT CHANGE UP (ref 22–31)
CREAT SERPL-MCNC: 0.85 MG/DL — SIGNIFICANT CHANGE UP (ref 0.5–1.3)
EGFR: 71 ML/MIN/1.73M2 — SIGNIFICANT CHANGE UP
EOSINOPHIL # BLD AUTO: 0.12 K/UL — SIGNIFICANT CHANGE UP (ref 0–0.5)
EOSINOPHIL NFR BLD AUTO: 1.1 % — SIGNIFICANT CHANGE UP (ref 0–6)
GLUCOSE SERPL-MCNC: 112 MG/DL — HIGH (ref 70–99)
HCT VFR BLD CALC: 43.8 % — SIGNIFICANT CHANGE UP (ref 34.5–45)
HGB BLD-MCNC: 14.7 G/DL — SIGNIFICANT CHANGE UP (ref 11.5–15.5)
IMM GRANULOCYTES NFR BLD AUTO: 0.3 % — SIGNIFICANT CHANGE UP (ref 0–0.9)
LYMPHOCYTES # BLD AUTO: 18.3 % — SIGNIFICANT CHANGE UP (ref 13–44)
LYMPHOCYTES # BLD AUTO: 2.07 K/UL — SIGNIFICANT CHANGE UP (ref 1–3.3)
MAGNESIUM SERPL-MCNC: 1.9 MG/DL — SIGNIFICANT CHANGE UP (ref 1.6–2.6)
MCHC RBC-ENTMCNC: 31.8 PG — SIGNIFICANT CHANGE UP (ref 27–34)
MCHC RBC-ENTMCNC: 33.6 GM/DL — SIGNIFICANT CHANGE UP (ref 32–36)
MCV RBC AUTO: 94.8 FL — SIGNIFICANT CHANGE UP (ref 80–100)
MONOCYTES # BLD AUTO: 1.23 K/UL — HIGH (ref 0–0.9)
MONOCYTES NFR BLD AUTO: 10.9 % — SIGNIFICANT CHANGE UP (ref 2–14)
NEUTROPHILS # BLD AUTO: 7.78 K/UL — HIGH (ref 1.8–7.4)
NEUTROPHILS NFR BLD AUTO: 68.8 % — SIGNIFICANT CHANGE UP (ref 43–77)
NRBC # BLD: 0 /100 WBCS — SIGNIFICANT CHANGE UP (ref 0–0)
PLATELET # BLD AUTO: 338 K/UL — SIGNIFICANT CHANGE UP (ref 150–400)
POTASSIUM SERPL-MCNC: 3.7 MMOL/L — SIGNIFICANT CHANGE UP (ref 3.5–5.3)
POTASSIUM SERPL-SCNC: 3.7 MMOL/L — SIGNIFICANT CHANGE UP (ref 3.5–5.3)
RBC # BLD: 4.62 M/UL — SIGNIFICANT CHANGE UP (ref 3.8–5.2)
RBC # FLD: 15 % — HIGH (ref 10.3–14.5)
SODIUM SERPL-SCNC: 134 MMOL/L — SIGNIFICANT CHANGE UP (ref 132–145)
WBC # BLD: 11.3 K/UL — HIGH (ref 3.8–10.5)
WBC # FLD AUTO: 11.3 K/UL — HIGH (ref 3.8–10.5)

## 2024-09-28 PROCEDURE — 99284 EMERGENCY DEPT VISIT MOD MDM: CPT

## 2024-09-28 RX ADMIN — Medication 200 MILLIGRAM(S): at 12:00

## 2024-09-28 NOTE — ED ADULT NURSE NOTE - OBJECTIVE STATEMENT
"Pt states increased urinary frequency. Denies fevers."    pt stable, not in any acute distress, care ongoing.

## 2024-09-28 NOTE — ED PROVIDER NOTE - OBJECTIVE STATEMENT
74-year-old female with frequent UTIs followed by urology and has Cipro prophylactically presents today complaining of urinary symptoms including urinary frequency and dark urine.  She has not taken her preventative Cipro.  She denies fever, chills, vomiting, abdominal pain, back pain or flank pain. patient's  notes that she has previously drinking so much water that she was reported to be losing potassium.

## 2024-09-28 NOTE — ED PROVIDER NOTE - PATIENT PORTAL LINK FT
You can access the FollowMyHealth Patient Portal offered by [facility] by registering at the following website: [email or web address]. By joining [employer]s FollowMyHealth portal, you will also be able to view your health information using other applications (apps) compatible with our system.

## 2024-09-28 NOTE — ED PROVIDER NOTE - PHYSICAL EXAMINATION
Constitutional: Well appearing, awake, alert, oriented to person, place, time/situation and in no apparent distress.  HEENT: Airway patent, NCAT  Resp: no conversational dyspnea, tachypnea, or signs of respiratory distress  Msk: ambulating with normal steady gait  Neuro: A&Ox3  : no CVAT, no bladder tenderness or distension

## 2024-09-28 NOTE — ED PROVIDER NOTE - CLINICAL SUMMARY MEDICAL DECISION MAKING FREE TEXT BOX
Patient presents today with complaint of dark malodorous and frequent urination.  History of frequent UTIs.  Usually prescribed Cipro by her urologist.  Will check labs because patient's   Notes a history of lab abnormalities in the setting of UTI.

## 2024-10-01 DIAGNOSIS — N39.0 URINARY TRACT INFECTION, SITE NOT SPECIFIED: ICD-10-CM

## 2024-10-01 DIAGNOSIS — Z88.2 ALLERGY STATUS TO SULFONAMIDES: ICD-10-CM

## 2024-10-01 DIAGNOSIS — Z88.0 ALLERGY STATUS TO PENICILLIN: ICD-10-CM

## 2024-10-01 DIAGNOSIS — R35.0 FREQUENCY OF MICTURITION: ICD-10-CM

## 2024-11-14 ENCOUNTER — EMERGENCY (EMERGENCY)
Facility: HOSPITAL | Age: 74
LOS: 1 days | Discharge: HOME | End: 2024-11-14
Attending: EMERGENCY MEDICINE | Admitting: EMERGENCY MEDICINE
Payer: MEDICARE

## 2024-11-14 VITALS
SYSTOLIC BLOOD PRESSURE: 150 MMHG | HEIGHT: 59 IN | DIASTOLIC BLOOD PRESSURE: 80 MMHG | RESPIRATION RATE: 16 BRPM | WEIGHT: 119.93 LBS | HEART RATE: 108 BPM | TEMPERATURE: 98 F | OXYGEN SATURATION: 98 %

## 2024-11-14 LAB
ALBUMIN SERPL ELPH-MCNC: 3.6 G/DL — SIGNIFICANT CHANGE UP (ref 3.4–5)
ALP SERPL-CCNC: 92 U/L — SIGNIFICANT CHANGE UP (ref 40–120)
ALT FLD-CCNC: 28 U/L — SIGNIFICANT CHANGE UP (ref 12–42)
ANION GAP SERPL CALC-SCNC: 4 MMOL/L — LOW (ref 9–16)
APTT BLD: 30.4 SEC — SIGNIFICANT CHANGE UP (ref 24.5–35.6)
AST SERPL-CCNC: 22 U/L — SIGNIFICANT CHANGE UP (ref 15–37)
BASOPHILS # BLD AUTO: 0.07 K/UL — SIGNIFICANT CHANGE UP (ref 0–0.2)
BASOPHILS NFR BLD AUTO: 0.8 % — SIGNIFICANT CHANGE UP (ref 0–2)
BILIRUB SERPL-MCNC: 0.5 MG/DL — SIGNIFICANT CHANGE UP (ref 0.2–1.2)
BUN SERPL-MCNC: 23 MG/DL — SIGNIFICANT CHANGE UP (ref 7–23)
CALCIUM SERPL-MCNC: 9.6 MG/DL — SIGNIFICANT CHANGE UP (ref 8.5–10.5)
CHLORIDE SERPL-SCNC: 107 MMOL/L — SIGNIFICANT CHANGE UP (ref 96–108)
CO2 SERPL-SCNC: 30 MMOL/L — SIGNIFICANT CHANGE UP (ref 22–31)
CREAT SERPL-MCNC: 1.14 MG/DL — SIGNIFICANT CHANGE UP (ref 0.5–1.3)
EGFR: 50 ML/MIN/1.73M2 — LOW
EOSINOPHIL # BLD AUTO: 0.39 K/UL — SIGNIFICANT CHANGE UP (ref 0–0.5)
EOSINOPHIL NFR BLD AUTO: 4.7 % — SIGNIFICANT CHANGE UP (ref 0–6)
GLUCOSE SERPL-MCNC: 109 MG/DL — HIGH (ref 70–99)
HCT VFR BLD CALC: 45.3 % — HIGH (ref 34.5–45)
HGB BLD-MCNC: 15 G/DL — SIGNIFICANT CHANGE UP (ref 11.5–15.5)
IMM GRANULOCYTES NFR BLD AUTO: 0.2 % — SIGNIFICANT CHANGE UP (ref 0–0.9)
INR BLD: 0.92 — SIGNIFICANT CHANGE UP (ref 0.85–1.16)
LYMPHOCYTES # BLD AUTO: 1.83 K/UL — SIGNIFICANT CHANGE UP (ref 1–3.3)
LYMPHOCYTES # BLD AUTO: 21.8 % — SIGNIFICANT CHANGE UP (ref 13–44)
MCHC RBC-ENTMCNC: 31.3 PG — SIGNIFICANT CHANGE UP (ref 27–34)
MCHC RBC-ENTMCNC: 33.1 G/DL — SIGNIFICANT CHANGE UP (ref 32–36)
MCV RBC AUTO: 94.6 FL — SIGNIFICANT CHANGE UP (ref 80–100)
MONOCYTES # BLD AUTO: 1.11 K/UL — HIGH (ref 0–0.9)
MONOCYTES NFR BLD AUTO: 13.2 % — SIGNIFICANT CHANGE UP (ref 2–14)
NEUTROPHILS # BLD AUTO: 4.96 K/UL — SIGNIFICANT CHANGE UP (ref 1.8–7.4)
NEUTROPHILS NFR BLD AUTO: 59.3 % — SIGNIFICANT CHANGE UP (ref 43–77)
NRBC # BLD: 0 /100 WBCS — SIGNIFICANT CHANGE UP (ref 0–0)
PLATELET # BLD AUTO: 308 K/UL — SIGNIFICANT CHANGE UP (ref 150–400)
POTASSIUM SERPL-MCNC: 4.4 MMOL/L — SIGNIFICANT CHANGE UP (ref 3.5–5.3)
POTASSIUM SERPL-SCNC: 4.4 MMOL/L — SIGNIFICANT CHANGE UP (ref 3.5–5.3)
PROT SERPL-MCNC: 7.7 G/DL — SIGNIFICANT CHANGE UP (ref 6.4–8.2)
PROTHROM AB SERPL-ACNC: 10.7 SEC — SIGNIFICANT CHANGE UP (ref 9.9–13.4)
RBC # BLD: 4.79 M/UL — SIGNIFICANT CHANGE UP (ref 3.8–5.2)
RBC # FLD: 15 % — HIGH (ref 10.3–14.5)
SODIUM SERPL-SCNC: 141 MMOL/L — SIGNIFICANT CHANGE UP (ref 132–145)
WBC # BLD: 8.38 K/UL — SIGNIFICANT CHANGE UP (ref 3.8–10.5)
WBC # FLD AUTO: 8.38 K/UL — SIGNIFICANT CHANGE UP (ref 3.8–10.5)

## 2024-11-14 PROCEDURE — 99284 EMERGENCY DEPT VISIT MOD MDM: CPT

## 2024-11-14 RX ORDER — MUPIROCIN 20 MG/G
1 OINTMENT TOPICAL
Qty: 1 | Refills: 1
Start: 2024-11-14 | End: 2024-12-03

## 2024-11-14 RX ORDER — CLOTRIMAZOLE AND BETAMETHASONE DIPROPIONATE 10; .5 MG/G; MG/G
1 CREAM TOPICAL
Qty: 1 | Refills: 1
Start: 2024-11-14 | End: 2024-12-03

## 2024-11-14 NOTE — ED PROVIDER NOTE - CLINICAL SUMMARY MEDICAL DECISION MAKING FREE TEXT BOX
L pinky and forearms bilat rash Patient is a 74-year-old female complaining of a scaling, crusting, mildly erythematous rash on the dorsum of her left pinky as well is in the antecubital creases of her forearms for a few weeks.  The rash is mildly itchy.  Patient noted to have dry skin on hands.  Patient is allergic to sulfa.  Patient had a history of calcium deposits on her breast 10 years ago and takes atorvastatin, amlodipine, benazepril, levothyroxine.  Her primary care doctor is Dr. Humza Hernandez at [facility] [address]  and she last saw him about a month ago.  No fevers or chills.  No lymphangitic streaks.    Will prescribe combination steroid antifungal as well as Bactroban topical creams.  Apply twice a day for 7 to 10 days.  Follow-up with primary care and dermatology in a week.

## 2024-11-14 NOTE — ED PROVIDER NOTE - CAPILLARY REFILL
Message noted: Chart reviewed and may refill medication times one 90 day supply as requested with 1 additional refill. Prescription sent to listed pharmacy. Pharmacy to notify patient.  Pt notified through [facility] less than 2 seconds

## 2024-11-14 NOTE — ED PROVIDER NOTE - CARE PROVIDER_API CALL
BHARATH BURGER  [address] [place] [address]  Phone: [phone]  Fax: [phone]  Follow Up Time:     Ania Willard  [address], [place] [address]  Phone: [phone]  Fax: [phone]  Follow Up Time:

## 2024-11-14 NOTE — ED ADULT TRIAGE NOTE - CHIEF COMPLAINT QUOTE
Rash to left arm and left fifth finger x 3 weeks. wound reddened with slight drainage. Denies fevers.

## 2024-11-14 NOTE — ED PROVIDER NOTE - OBJECTIVE STATEMENT
Patient is a 74-year-old female complaining of a scaling, crusting, mildly erythematous rash on the dorsum of her left pinky as well is in the antecubital creases of her forearms for a few weeks.  The rash is mildly itchy.  Patient noted to have dry skin on hands.  Patient is allergic to sulfa.  Patient had a history of calcium deposits on her breast 10 years ago and takes atorvastatin, amlodipine, benazepril, levothyroxine.  Her primary care doctor is Dr. Humza Hernandez at [facility] [address]  and she last saw him about a month ago.  No fevers or chills.  No lymphangitic streaks.

## 2024-11-14 NOTE — ED ADULT NURSE NOTE - AREA
1. Good brisk reaction to aldara, question is if anything persists post healing  2. Let heal for next month, send photo when healed/ appears healed  3. Consider consult Dr Ornelas if need further biopsy      
[facility]    Hematology/Oncology Established Patient Follow Up Note      Today's Date: 8/1/2023    Reason for follow up: Sigmoid cancer    HISTORY OF PRESENT ILLNESS: Nadia Ladd is a 64 year old female who was referred to the Hematology/Oncology Clinic for sigmoid cancer    Patient has medical history including Crohn's disease on sulfasalazine, anemia secondary to iron deficiency and B12 deficiency, obesity, hypertension, prediabetes, eczema, pulmonary hypertension, hiatal hernia, mild splenomegaly (14.7 cm in 2/23)    - 2/23 pt noted increasing fatigue, found to have iron deficiency anemia w/hgb 6.8 (previously required RBC transfusion when dx w/Crohn's), did have intermittent changes in stool but not persistent (noted minimal blood in stool)   - 2/23 upper endoscopy for iron deficiency anemia and Crohn's disease: Hiatal hernia, normal stomach, normal duodenum  - 2/23 colonoscopy: A frond-like/villous partially obstructing large mass found in sigmoid colon, partially circumferential involving two thirds of the lumen circumference, 4 cm, unable to traverse, with oozing, s/p biopsy  PATHOLOGY:  A.  Stomach, antrum: Biopsy:  - Antral type mucosa with mild chronic inactive gastritis  - Immunostain for Helicobacter pylori is negative      B.  Colon, sigmoid, stricture: Biopsy:  - Invasive poorly differentiated carcinoma  The sigmoid stricture shows a high-grade carcinoma without definitive gland formation.  Given the poorly differentiated appearance, an immunohistochemical panel was performed to exclude the possibility of a tumor by direct extension or metastasis.   Immunohistochemical stains are performed and show the tumor to be diffusely positive for CK7 and partially positive for CK20 and SATB2.  There is no significant tumor reactivity for CDX2, GATA3, PAX8, TTF-1, and chromogranin.  Synaptophysin highlights very rare positive cells. Although the CK7/CK20 profile is not entirely 
"typical for a colorectal carcinoma, immunostains for tumors of other origins are negative and a colorectal primary is still favored.   - Mismatch repair:  1) MLH1-loss of nuclear expression  2) MSH2-intact  3) MSH6-intact  4) PMS2-loss of nuclear expression  -MLH1 promoter methylation: NEGATIVE    -2/23 CT CAP:  A) 4 cm length mass in the proximal sigmoid colon. There is some irregularity and stranding in the adjacent pericolonic fat which may indicate tumor extension. There is no obstruction.   B) there is a second probable mass at the hepatic flexure of the colon measuring 2.5 cm in length   C)There are small lymph nodes in the mesial colon adjacent to the hepatic flexure abnormality and the sigmoid colonic mass. No lymphadenopathy by size criteria  D) There is submucosal fatty deposition in the colon, most severe in the distal sigmoid colon and rectum, which can be seen secondary to quiescent inflammatory bowel disease.  E) no liver lesion    -3/23 PET:  a. There is increased FDG avidity of the sigmoid colon with focal lobular wall thickening consistent with biopsy-proven adenocarcinoma.  b. Secondary focus noted at the hepatic flexure demonstrates elevated FDG avidity and lobulated wall thickening highly suspicious for a additional primary.  c. Additionally there is a smaller focus of wall thickening with elevated FDG avidity along the left aspect of the transverse colon  which is suspicious for a possible third focus of colonic malignancy.    -3/23 CEA 6.8  - 3/23 colorectal surgery consultation with - in the setting of Crohn's, at least sigmoid colectomy with possible total abdominal colectomy with either ileorectal anastomosis or end ileostomy (\"rectum can remain active and be actively surveyed annually\")    -3/23 genetic counseling, testing for Chan syndrome    - 4/5/2023 gynecologic oncology consultation for risk reduction surgery with hysterectomy and BSO at time of colectomy    -4/6/2023 "
laparoscopic converted to open total abdominal colectomy with ileorectal anastomosis, partial omentectomy, flexible sigmoidoscopy, TAHBSO  A. OMENTUM, RESECTION:  - Adipose tissue with no significant histopathologic abnormality  - No evidence of malignancy     B. COLON, RESECTION:  Mass #1 (ascending colon/hepatic flexure): Mixed neuroendocrine-non-neuroendocrine neoplasm (MINEN):  - Neuroendocrine carcinoma component (70%) and moderately-differentiated adenocarcinoma component (30%)  - Size = 5.0 cm, invading into muscularis propria  - Positive LVI  - Negative macroscopic tumor perforation, negative PNI  - Negative surgical resection margins  - IHC: Neuroendocrine portion: Negative for chromogranin and INSM1 but strongly express synaptophysin  - IHC: Adenocarcinoma portion: Positive for CK20, CDX2 negative for CK7, PAX8, ER, S100  Ki-67 proliferation index of approximately 80%.  MMR:  Intact nuclear expression of MLH1, MSH2, MHS6, PMS2  PDL1:  COMBINED POSITIVE SCORE (CPS): 55-60  TUMOR PROPORTION SCORE (TPS): 50%   pathogenic KRAS mutation (G13D) was identified (5.2%).  AJCC 8th edition: stage 3B mpT3 pN1a     Mass#2 (sigmoid colon): Poorly-differentiated carcinoma:  - Grade 3  - Size = 5.7 cm, invading into muscularis propria  - Negative for microscopic tumor perforation, LVI, perineural invasion  - Negative surgical resection margins  - IHC: Positive for scar and keratin AE1/AE3, negative for CK7, CK20, CDX2, PAX8, ER, chromogranin, CD56, p40, synaptophysin, S100, INI1, p40, INSM1  Ki-67 proliferation index of approximately 70%.  MMR: loss of nuclear expression MLH1 and PMS2, intact nuclear expression MSH2 and MSH6  PDL1:  COMBINED POSITIVE SCORE (CPS): 80  TUMOR PROPORTION SCORE (TPS): 70%  pathogenic KRAS mutation (G13D) was identified (4.5%).  AJCC 8th edition: stage 3A mpT2 pN1a    - One of forty-one sampled lymph nodes positive (1/41)  - Benign appendix  - Tubular adenoma    C. UTERUS, CERVIX, BILATERAL 
FALLOPIAN TUBES & OVARIES, :  - Benign endometrial polyps; atrophic endometrium  - Uterus, cervix, bilateral fallopian tubes, and ovaries with no significant morphologic abnormalities  - No evidence of dysplasia or malignancy     D. SMALL INTESTINE, TERMINAL ILEUM, TERMINAL ILIUM:  - Benign ileum  - No evidence of dysplasia or malignancy  - Negative for metastases to one of one sampled lymph node (0 /1)     E. PROXIMAL ANASTOMOTIC RING:  - Benign small intestine  - No evidence of dysplasia or malignancy     F. DISTAL ANASTOMOTIC RING:  - Benign colon  - No evidence of dysplasia or malignancy    CRC NGS:   --mutations positive for KRAS G13D mutation 5.2% mass 1, KRAS G13D mutation 4.5% mass 2 (negative for AKT1; BRAF; ERBB2; KRAS; NRAS; PIK3CA; PTEN)  --TMB score: 17.064 mut/Mb mass 1 (CPS 55-60, TPS 50%), 60.943 mut/Mb mass 2 (CPS 80, TPS 70%)  --fusion negative including NTRK and RET for mass 1 and 2 (also negative for AKT1, AKT3, ALK, AR, QNRYEV48, CLAUDINE, BCOR, BRAF, BRD3, BRD4, CAMTA1, CCNB3, CCND1, , CIC, CSF1, CSF1R, CTNNB1, DNAJB1, EGFR, EPC1, ERBB2, ERBB4, ERG, ESR1, ESRRA, ETV1, ETV4, ETV5, ETV6, EWSR1, FGFR1, FGFR2, FGFR3, FGR, FOS, FOSB, FOXO1, FOXO4, FOXR2, FUS, GLI1, GRB7, HMGA2, HRAS, IDH1, IDH2, INSR, JAK2, JAK3, JAZF1, KRAS, MAML2, MAP2K1, MAST1, MAST2, MEAF6, MET, MKL2, MN1, MSMB, MUSK, MYB, MYBL1, MYOD1, NCOA1, NCOA2, NOTCH1, NOTCH2, NR4A3, NRAS, NRG1, NTRK1, NTRK2, NTRK3, NUMBL1, NUTM1, PAX3, PDGFB, PDGFRA, PDGFRB, PHF1, PIK3CA, PKN1, PLAG1, PPARG, PRKACA, PRKCA, PRKCB, RAF1, RELA, RET, ROS1, RPSO2, RSPO3, SS18, STAT6, TAF15, TCF12, TERT, TFE3, TFEB, TFG, THADA, TMPRSS2, USP6, VGLL2, YAP1, YWHAE)    -4/11/2023 patient discharged from hospital, planning for 30 days of lovenox postop, oxycodone for pain (required 1 unit PRBC for anemia)    -5/8/23 I presented this case at [facility] CRC tumor board and their recommendations include:  - common to see this in Crohn's, overall poor prognosis, treat 
aggressively, may be poorly responsive to chemotherapy  - standard of care is mFOLFOX 6 x 12 cycles  - acknowledge that pt has high TPS and likely response to immunotherapy however not sufficient data or standard of care in stage 3 adjuvant setting  - add MMR testing to mass #1 hepatic flexure mass    - 5/9/23 admitted for acute renal failure w/Cr 3.82 w/K 7.0    - 5/19/23 second opinion at [facility] w/medical oncologist Dr. Acharya, thorough consultation and recommendations appreciated     - pt would like to proceed with FOFLOX (with dose reduced oxaliplatin due to kidney function)    -5/2023 genetic counseling: Negative for mutations in EPCAM, MLH1, MSH2, MSH6, and PMS2 genes.  Negative for mutations in APC, AXIN2, BMPR1A, CDH1, CHEK2, EPCAM, GREM1, MLH1, MSH2, MSH3, MSH6, MUTYH, NTHL1, PMS2, POLD1, POLE, PTEN, SMAD4, STK11, TP53, CDKN1B, MEN1, NF1, RET, TSC1, TSC2, and VHL genes    - 5/25/23 port placement    - 5/26/23 CT CAP w/ contrast and IVF before and after CT (baseline prior to starting tx):  1.  3 mm nodule RML and 5 mm lateral EDSON nodule, minimally increased from previous  2.  New 4 mm EDSON groundglass density nodule  3.  Several prominent borderline enlarged mediastinal lymph nodes slightly increased from previous  4.  Splenomegaly 14.5 cm  5. S/p subtotal colectomy with ileorectal anastomosis with postsurgical changes along the ventral abdominal wall midline    -5/31/23 started mFOLFOX 6 w/dose reduced oxaliplatin      - plan for C2D1 6/14/23 held due to new onset paroxysmal A-fib with RVR requiring hospitalization 6/3/2023 (also hypoMg, dehydrated)  - 6/19/2023 I presented this case at [facility] colorectal tumor board at the [facility], it is possible that paroxysmal A-fib may be related to 5-FU.  Options include withholding further treatment versus retrialing 5-FU under the guidance of cardio oncology in an inpatient setting.  No other adjuvant treatment options available, including immunotherapy  - 
6/30/2023 consultation with cardio oncologist Dr. Garza; I spoke with Dr. Garza 6/30/2023 as well, [id number] score 3, recommending starting apixaban 5 mg twice daily, okay to retrial FOLFOX while patient is on metoprolol      INTERIM HISTORY:  REGIMEN:  mFOLFOX6  q14 days x12 cycles/6 months  C1D1 5/31/23 (complicated by hospitalization for afib w/RVR C1D4 6/3/23), C2D1 7/19/23 (inpatient w/continuous cardiac monitoring)  oxaliplatin 64mg/m2 day 1 (dose reduced from 85mg/m2 2/2 Cr C1D1)  LV 350mg/m2 day 1  5FU 1200mg/m2 continuous infusion day 1-2 (total 2,400mg/m2 IV over 46-48 hours)  (5FU 400mg/m2 IV push day 1 (discontinued cycle 2 onwards))  PLUS IVF and magnesium level day 8  Emetic risk: moderate  Febrile neutropenia risk: intermediate     7/18/23 CT CAP- subcm lung nodules stable, borderline and mildly enlarged mediastinal LN and periportal LN stable, splenomegaly 15.7cm, Subtotal colectomy with ileorectal anastomosis. No acute inflammation or obstruction     Pt tolerated cycle 2 mFOLFOX6 inpatient well, no reported cardiac events except pt reported sensation of heart fluttering for 3-5 minutes at rest while watching TV- in hospital was told this was PVCs    Treatment related AE:  - hearing changes-described as muffled with intermittent ringing with obscured hearing R>L- overall improved since 1st cycle, Flonase BID as needed, ENT consultation pending 10/23; (present prior to starting chemo, improved w/steroids, now stable and not getting better)   - Paroxysmal A-fib with RVR and PAC- admitted to [facility] 6/3/2023 - 6/5/2023 for this, 6/3/2023 echo EF 55-60%, mild pulmonary hypertension, mild mitral regurgitation, on metoprolol XL 25 mg p.o. daily for ongoing palpitations, 7/23 Cardio oncology consult w/Dr. Garza, on eliquis, no recurrence of RVR while inpatient for cycle 2 w/continuous cardiac monitoring; pt working w/cardiology team regarding medication cost, of note- coumadin had CLASS D 
interaction with 5FU chemotherapy, Mg today and repeat day 8  - Delayed neutropenia- neutropenic thierry ANC 0.5 2 weeks out from cycle 1, afebrile, 6/23 DPD deficiency testing negative; cycle 2 onwards 5FU bolus dropped   -Normocytic anemia- due to chemotherapy and iron deficiency, s/p ferric carboxymaltose 750mg x2 doses 6/14/23 and 6/28/23  -Thrombocytopenia- 2/2 chemotherapy   - MARYA on CKD- following w/nephro, Cr improved to 1.23 from 3  - diarrhea- grade 1, C2D4-5, improved with imodium (3 total)   - cold sensitivity- intermittent, grade 1      REVIEW OF SYSTEMS:   A 14 point ROS was reviewed with pertinent positives and negatives in the HPI.        HOME MEDICATIONS:  Current Outpatient Medications   Medication Sig Dispense Refill    apixaban ANTICOAGULANT (ELIQUIS ANTICOAGULANT) 5 MG tablet Take 1 tablet (5 mg) by mouth 2 times daily 60 tablet 11    cyanocobalamin 1000 MCG TBCR Take 1,000 mcg by mouth daily 100 tablet 1    Ferrous Sulfate (IRON) 325 (65 Fe) MG tablet Take 1 tablet by mouth daily      MAGNESIUM OXIDE 400 (240 Mg) MG tablet TAKE 1 TABLET (400 MG) BY MOUTH 2 TIMES DAILY 60 tablet 0    metoprolol succinate ER (TOPROL XL) 25 MG 24 hr tablet TAKE 1 TABLET (25 MG) BY MOUTH DAILY 30 tablet 1    Multiple Vitamins-Iron (MULTI-DAY PLUS IRON PO) Take 1 tablet by mouth daily      sodium bicarbonate 650 MG tablet Take 1 tablet (650 mg) by mouth 2 times daily 120 tablet 3         ALLERGIES:  Allergies   Allergen Reactions    No Known Drug Allergy          PAST MEDICAL HISTORY:  Past Medical History:   Diagnosis Date    Arthropathia 08/05/2010    B12 deficiency anemia 10/21/2010    Benign essential hypertension with target blood pressure below 140/90 10/10/2016    CARDIOVASCULAR SCREENING; LDL GOAL LESS THAN 160 10/31/2010    Crohn's colitis (H) 02/15/2011    Dermatitis-dishydrotic eczema-severe 08/05/2010    Hiatal hernia     HTN (hypertension) 07/21/2011    Iron deficiency anemia 10/21/2010    Malignant 
neoplasm of sigmoid colon (H)     Obesity     Primary pulmonary hypertension (H) 04/06/2010         PAST SURGICAL HISTORY:  Past Surgical History:   Procedure Laterality Date    COLECTOMY WITHOUT COLOSTOMY N/A 4/6/2023    Procedure: Laparoscopic Converted to Open Total abdominal colectomy;  Surgeon: Jerome Ashley MD;  Location: [facility] OR    COLONOSCOPY  10/11/10    COLONOSCOPY N/A 2/27/2023    Procedure: ATTEMPTED COLONOSCOPY WITH SIGMOID STRICTURE BIOPSY;  Surgeon: Jonathan Alcocer MD;  Location: [facility]    ESOPHAGOSCOPY, GASTROSCOPY, DUODENOSCOPY (EGD), COMBINED N/A 2/27/2023    Procedure: ESOPHAGOGASTRODUODENOSCOPY, WITH BIOPSY;  Surgeon: Jonathan Alcocer MD;  Location: [facility]    HYSTERECTOMY TOTAL ABDOMINAL, BILATERAL SALPINGO-OOPHORECTOMY, COMBINED N/A 4/6/2023    Procedure: Hysterectomy total abdominal, bilateral salpingo-oophorectomy;  Surgeon: Sunitha Olea MD;  Location: [facility] OR    INSERT PORT VASCULAR ACCESS Right 5/25/2023    Procedure: Ultrasound-guided right internal jugular venous access port placement with fluoroscopy;  Surgeon: Martin Thomas DO;  Location: [facility]    IR PORT CHECK RIGHT  7/18/2023    SIGMOIDOSCOPY FLEXIBLE N/A 4/6/2023    Procedure: Sigmoidoscopy flexible;  Surgeon: Jerome Ashley MD;  Location: [facility] OR    SURGICAL HISTORY OF -   06/14/76    Perineorrhaphy, for widening vaginal orifice    ZZC EXPLORATORY OF ABDOMEN  1989    laparoscopy         SOCIAL HISTORY:  Social History     Socioeconomic History    Marital status:      Spouse name: Not on file    Number of children: Not on file    Years of education: Not on file    Highest education level: Not on file   Occupational History    Not on file   Tobacco Use    Smoking status: Never     Passive exposure: Current    Smokeless tobacco: Never   Substance and Sexual Activity    Alcohol use: No    Drug use: No    Sexual activity: Yes     Partners: Male   Other Topics Concern    Parent/sibling w/ CABG, MI 
"or angioplasty before 65F 55M? Not Asked   Social History Narrative    Not on file     Social Determinants of Health     Financial Resource Strain: Not on file   Food Insecurity: Not on file   Transportation Needs: Not on file   Physical Activity: Not on file   Stress: Not on file   Social Connections: Not on file   Intimate Partner Violence: Not on file   Housing Stability: Not on file         FAMILY HISTORY:  Family History   Problem Relation Age of Onset    Hypertension Mother         on meds, alive    Cerebrovascular Disease Father         stroke about age 65,  of cancer at 68 yrs    Diabetes Father         eventually took insulin    Anemia Father         Pernisios anemia    Kidney Disease Niece         kidney transplant    Kidney Disease Nephew         kidney transplant    Venous thrombosis No family hx of     Anesthesia Reaction No family hx of          PHYSICAL EXAM:  Vital signs:  /82   Pulse 96   Temp (!) 96.4  F (35.8  C) (Temporal)   Resp 18   Ht 1.53 m (5' 0.24\")   Wt 80.3 kg (177 lb)   LMP  (LMP Unknown)   SpO2 97%   BMI 34.29 kg/m         ECO  GENERAL/CONSTITUTIONAL: No acute distress.  EYES:  Extraocular movements intact without nystagmus.  No scleral icterus.  RESPIRATORY: Equal chest rise.   MUSCULOSKELETAL: Warm and well-perfused, no cyanosis, clubbing, or edema. Right LE always more swollen than left, chronic unchanged.   NEUROLOGIC: Cranial nerves are grossly intact. Alert, oriented to person, place and time, answers questions appropriately.  INTEGUMENTARY: No rashes or jaundice.      LABS:   Latest Reference Range & Units 23 13:31   Sodium 136 - 145 mmol/L 143   Potassium 3.4 - 5.3 mmol/L 4.0   Chloride 98 - 107 mmol/L 107   Carbon Dioxide (CO2) 22 - 29 mmol/L 24   Urea Nitrogen 8.0 - 23.0 mg/dL 15.6   Creatinine 0.51 - 0.95 mg/dL 1.37 (H)   GFR Estimate >60 mL/min/1.73m2 42 (L)   Calcium 8.8 - 10.2 mg/dL 8.7 (L)   Anion Gap 7 - 15 mmol/L 12   Magnesium 1.7 - 2.3 "
distal
mg/dL 1.7   Albumin 3.5 - 5.2 g/dL 3.8   Protein Total 6.4 - 8.3 g/dL 6.0 (L)   Alkaline Phosphatase 35 - 104 U/L 207 (H)   ALT 0 - 50 U/L 19   AST 0 - 45 U/L 26   Bilirubin Total <=1.2 mg/dL 0.4   Glucose 70 - 99 mg/dL 104 (H)   WBC 4.0 - 11.0 10e3/uL 9.6   Hemoglobin 11.7 - 15.7 g/dL 12.0   Hematocrit 35.0 - 47.0 % 36.9   Platelet Count 150 - 450 10e3/uL 139 (L)   RBC Count 3.80 - 5.20 10e6/uL 4.18   MCV 78 - 100 fL 88   MCH 26.5 - 33.0 pg 28.7   MCHC 31.5 - 36.5 g/dL 32.5   RDW 10.0 - 15.0 % 16.0 (H)   % Neutrophils % 79   % Lymphocytes % 12   % Monocytes % 6   % Eosinophils % 0   % Basophils % 0   % Myelocytes % 3   Absolute Basophils 0.0 - 0.2 10e3/uL 0.0   Absolute Neutrophil 1.6 - 8.3 10e3/uL 7.6   Absolute Lymphocytes 0.8 - 5.3 10e3/uL 1.2   Absolute Monocytes 0.0 - 1.3 10e3/uL 0.6   Absolute Eosinophils 0.0 - 0.7 10e3/uL 0.0   Absolute Myelocytes <=0.0 10e3/uL 0.3 (H)   RBC Morphology  Confirmed RBC Indices   Platelet Morphology Automated Count Confirmed. Platelet morphology is normal.  Automated Count Confirmed. Platelet morphology is normal.   (H): Data is abnormally high  (L): Data is abnormally low    PATHOLOGY:    IMAGING:  EXAM: CT CHEST/ABDOMEN/PELVIS W CONTRAST  LOCATION: [facility]  DATE: 7/18/2023     INDICATION: ascenidng colon hepatic cancer  COMPARISON: None.  TECHNIQUE: CT scan of the chest, abdomen, and pelvis was performed following injection of IV contrast. Multiplanar reformats were obtained. Dose reduction techniques were used.   CONTRAST: 88  ml Isovue 370     FINDINGS:   LUNGS AND PLEURA: Mosaic attenuation throughout the lungs. Subcentimeter nodules described previously remain unchanged (e.g. 4/85, 95, 157). No new or enlarging nodules. No pleural effusion or pneumothorax.     MEDIASTINUM/AXILLAE: Unchanged borderline and mildly enlarged mediastinal lymph nodes. For example, right paratracheal lymph node measures 1.0 cm short axis (3/48), previously 1.0 cm 
short axis. Right chest port catheter terminates in the lower SVC.     CORONARY ARTERY CALCIFICATION: None.     HEPATOBILIARY: Normal.     PANCREAS: Normal.     SPLEEN: Enlarged, 15.7 cm craniocaudal.     ADRENAL GLANDS: Normal.     KIDNEYS/BLADDER: Multifocal cortical thinning and scarring. No collecting system dilation. The bladder is mostly collapsed and suboptimally evaluated.     BOWEL: Subtotal colectomy with ileorectal anastomosis. No acute inflammation or obstruction.     LYMPH NODES: Unchanged borderline and mildly enlarged periportal lymph nodes. For example, portacaval lymph node measures 1.2 cm short axis (3/147).     VASCULATURE: Unremarkable.     PELVIC ORGANS: Surgically absent.     MUSCULOSKELETAL: Normal.                                                                      IMPRESSION:  1.  Subtotal colectomy with ileorectal anastomosis. No new or worsening disease.     2.  Borderline and mildly enlarged mediastinal and periportal lymph nodes are unchanged.     3.  Tiny pulmonary nodules remain unchanged, although still indeterminate due to small size. Mosaic attenuation the lungs is nonspecific, but most commonly related to small airways disease.     4.  Splenomegaly.    ASSESSMENT/PLAN:  Nadia Ladd is a 64 year old female with:      # ascending colon/hepatic flexure Mixed neuroendocrine-non-neuroendocrine neoplasm (MINEN, poorly differentiated neuroendocrine carcinoma and moderately differentiated adenocarcinoma), KRAS G13D mutated, MARISSA, TPS 50%  # sigmoid colon poorly-differentiated carcinoma, KRAS G13D mutated, loss of MLH1 and PMS2, TPS 70%  - 2/23 colonoscopy: A frond-like/villous partially obstructing large mass found in sigmoid colon, partially circumferential involving two thirds of the lumen circumference, 4 cm, unable to traverse, with oozing, s/p biopsy, PATHOLOGY: Invasive poorly differentiated carcinoma, IHC panel excludes tumors of other origin, colorectal primary is favored, loss 
of nuclear expression of MLH1 and PMS2, MLH1 promoter methylation negative, [id number] mutation negative  -2/23 CT CAP: Shows known 4 cm proximal sigmoid colon mass with possible tumor extension (irregularity and stranding and adjacent pericolonic fat) without obstruction AND probable second mass 2.5 cm at hepatic flexure of colon; small lymph nodes adjacent to both masses (no lymphadenopathy by size criteria)  -3/23 PET:  a. There is increased FDG avidity of the sigmoid colon with focal lobular wall thickening consistent with biopsy-proven adenocarcinoma.  b. Secondary focus noted at the hepatic flexure demonstrates elevated FDG avidity and lobulated wall thickening highly suspicious for a additional primary.  c. Additionally there is a smaller focus of wall thickening with elevated FDG avidity along the left aspect of the transverse colon which is suspicious for a possible third focus of colonic malignancy.  - 3/23 CEA 6.8  -4/6/2023 laparoscopic converted to open total abdominal colectomy with ileorectal anastomosis, partial omentectomy, flexible sigmoidoscopy, TAHBSO  PATHOLOGY:  Of note, omental resection, terminal ileum, proximal and distal anastomotic rings, uterus, cervix, bilateral fallopian tubes and ovaries negative for malignancy    Mass #1 (ascending colon/hepatic flexure): Mixed neuroendocrine-non-neuroendocrine neoplasm (MINEN):  - Neuroendocrine carcinoma component (70%) and moderately-differentiated adenocarcinoma component (30%)  - Size = 5.0 cm, invading into muscularis propria, Positive LVI  - IHC: Neuroendocrine portion: Negative for chromogranin and INSM1 but strongly express synaptophysin  - IHC: Adenocarcinoma portion: Positive for CK20, CDX2 negative for CK7, PAX8, ER, S100  Ki-67 proliferation index of approximately 80%.  MARISSA   PDL1:  COMBINED POSITIVE SCORE (CPS): 55-60  TUMOR PROPORTION SCORE (TPS): 50%   pathogenic KRAS mutation (G13D) was identified (5.2%).  MMR testing: intact  AJCC 8th 
edition: stage 3B mpT3 pN1a     Mass#2 (sigmoid colon): Poorly-differentiated carcinoma:  - Grade 3  - Size = 5.7 cm, invading into muscularis propria  - IHC: Positive for scar and keratin AE1/AE3, negative for CK7, CK20, CDX2, PAX8, ER, chromogranin, CD56, p40, synaptophysin, S100, INI1, p40, INSM1  Ki-67 proliferation index of approximately 70%.  MMR testing: loss of MLH1 and PMS2  PDL1:  COMBINED POSITIVE SCORE (CPS): 80  TUMOR PROPORTION SCORE (TPS): 70%  pathogenic KRAS mutation (G13D) was identified (4.5%).  MMR testing: loss of MLH1 and PMS2, intact MSH2 and MSH6  AJCC 8th edition: stage 3A mpT2 pN1a    - One of forty-one sampled lymph nodes positive (1/41), d/w pathology- unable to determine which mass is metastatic to LN    - 4/23 MRI brain w/wo contrast LAURENT  - 5/26/23 CT CAP w/ contrast and IVF before and after CT (post op baseline prior to starting tx):  1.  3 mm nodule RML and 5 mm lateral EDSON nodule, minimally increased from previous  2.  New 4 mm EDSON groundglass density nodule  3.  Several prominent borderline enlarged mediastinal lymph nodes slightly increased from previous  4.  Splenomegaly 14.5 cm  5. S/p subtotal colectomy with ileorectal anastomosis with postsurgical changes along the ventral abdominal wall midline  - [facility]23 [facility] as above   - 5/19/23 second opinion at [facility] w/medical oncologist Dr. Acharya, thorough consultation and recommendations appreciated, overall agree w/FOLFOX x6 months, possible nivo or ipi nivo in second line; if metastatic platinum etoposide vs ipi nivo  - 5/25/23 port placement    -5/2023 genetic counseling: Negative for mutations detailed below     REGIMEN:  REGIMEN:  [id number]  q14 days x12 cycles/6 months  C1D1 5/31/23 (complicated by hospitalization for afib w/RVR C1D4 6/3/23), C2D1 7/19/23 (inpatient w/continuous cardiac monitoring)  oxaliplatin 64mg/m2 day 1 (dose reduced from 85mg/m2 2/2 Cr C1D1)  LV 350mg/m2 day 1  5FU 
1200mg/m2 continuous infusion day 1-2 (total 2,400mg/m2 IV over 46-48 hours)  (5FU 400mg/m2 IV push day 1 (discontinued cycle 2 onwards))  PLUS IVF and magnesium level day 8  Emetic risk: moderate  Febrile neutropenia risk: intermediate     C3D1 8/2/23 planned pending labs, outpt    Treatment related AE:  - hearing changes-stable w/dose reduced oxaliplatin, flonase prn, ENT consultation pending 10/23  - Paroxysmal A-fib with RVR and PAC- continue metoprolol, eliquis, weekly IVF and Mg check; Mg 1.7 today- will give 2g IV Mg tomorrow, I do not recommend changing pts eliquis to coumadin given CLASS D interaction w/5FU chemotherapy   - Delayed neutropenia- neutropenic thierry ANC 0.5 2 weeks out from cycle 1, afebrile, 6/23 DPD deficiency testing negative, improved after 5FU bolus dropped w/cycle 2  - Normocytic anemia- due to chemotherapy and iron deficiency, s/p ferric carboxymaltose 750mg x2 doses 6/14/23 and 6/28/23; repeat iron studies normal  -Thrombocytopenia- plt 139K today, 2/2 chemo  - MARYA on CKD- following w/nephro, f/u with nephro 9/23  - diarrhea- grade 1  - cold sensitivity- grade 1    - 7/18/23 CT CAP- subcm lung nodules stable, borderline and mildly enlarged mediastinal LN and periportal LN stable, splenomegaly 15.7cm, Subtotal colectomy with ileorectal anastomosis. No acute inflammation or obstruction    - plan to repeat CT 10/23     - f/u with Dr. Ashley 4/2024 for rigid proctoscopy     #parxosymal afib  - within 3 days of receiving 5FU, prior cardiac risk factors htn, prediabetes  - 6/3/23 presented to ER w/lightheadedness, dizziness, cardioverted s/p diltiazem ggt in ER  - 6/23 DPD deficiency testing negative  - currently on metoprolol XL 25 mg daily, apixaban 5 mg twice daily  - 6/30/2023 consultation with cardio oncologist Dr. Garza; I spoke with Dr. Garza 6/30/2023 as well, [id number] score 3, recommending starting apixaban 5 mg twice daily, okay to retrial FOLFOX while patient is on 
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  Negative for mutations in APC, AXIN2, BMPR1A, CDH1, CHEK2, EPCAM, GREM1, MLH1, MSH2, MSH3, MSH6, MUTYH, NTHL1, PMS2, POLD1, POLE, PTEN, SMAD4, STK11, TP53, CDKN1B, MEN1, NF1, RET, TSC1, TSC2, and VHL genes    #Anemia secondary to iron deficiency and B12 deficiency  - terminal ileum removed at time of colon surgery, monitor for nutrient def  - 2/23 hgb 6.8, ferritin 21, iron sat index 10, TIBC 265, iron 27, b12 1493  - On B12 1000 mcg p.o. daily and ferrous sulfate 325 mg p.o. daily  - 4/9/23 s/p 1 uprbcs  - 5/9/23 hgb 11.3,5/30/23 hgb 9.4  - based on Ganzoni equation w/goal hgb 14 and 500mg needed for iron stores, pts iron deficit is 1400 mg  - s/p ferric carboxymaltose 750mg x2 doses 6/14/23 and 6/28/23  - 7/11/23 ferritin 1022, iron sat 22, TIBC 201, iron 44      #Crohn's  - dx since at least 2010     RTC 2 weeks for follow up with RADHA, labs, chemo  RTC 4 weeks for follow up with me, labs, chemo      Rienesoraya Bateman DO  Hematology/Oncology  [facility]

## 2024-11-14 NOTE — ED PROVIDER NOTE - NSFOLLOWUPINSTRUCTIONS_ED_ALL_ED_FT
Use the two creams on the rash twice a day for 7-10 days.  Follow up with Dr Carlson for a primary care and dermatology appointment in a week or call the dermatologists provided.    Acute Rash    WHAT YOU NEED TO KNOW:    What is an acute rash? A rash is irritated, red, or itchy skin or mucus membranes, such as the lining of your nose or throat. Acute means the rash starts suddenly, worsens quickly, and lasts a short time.    What are some common types of rashes?    Eczema causes inflamed, itchy areas. Your skin may be dry, scaly, and thick. The outer layer may be damaged. Irritants, stress, or a family history of eczema make you more likely to get it.    Contact dermatitis causes a small, itchy growth that may be flat or raised. It appears after you touch something that damages your skin or causes an allergic reaction. Examples include chemicals, metals, dye, soaps or detergents, and latex.    Atopic dermatitis causes small, itchy, blister-like growths along skin lines and folds. The growths may ooze fluid and become scaly, crusted, or hard. You may have sore, dry skin or swollen eyes. This rash usually forms after you are around an allergen, are overheated, or wear rough clothing.    Urticaria (hives) appears suddenly as patches and raised areas of swollen skin or mucus membrane. The area may itch or burn. Common causes include allergens, latex, certain foods, a bee sting, smoke, or a blood transfusion.    Pityriasis rosea may appear before you get a disease caused by bacteria or a virus. The rash may look like a patch on your chest, back, or abdomen. The rash may spread to become small, red, cone-shaped bumps that usually grow in groups.  How is an acute rash diagnosed? Your healthcare provider may know what kind of rash you have by looking at it. Tell him or her when and where the rash first appeared. Describe how often you get the rash and if anything causes it, such as food, activity, or stress. Give your provider a list of your medicines, allergies, and health conditions. Include any family history of rashes. A dermatologist (skin specialist) may help find the cause of your rash.    How is an acute rash treated? Treatment will depend on the condition causing your acute rash. You may need any of the following:    Medicines may be used to decrease itching or inflammation, or prevent or treat a bacterial infection. Medicines may also help your immune system fight infection or stop it from attacking your skin.    Ultraviolet phototherapy means the rash is put under light. Light therapy helps treats atopic dermatitis or eczema that does not get better with steroids. It can help pityriasis rosea heal faster and decrease itching.  What can I do to help prevent a rash or care for my skin when I have a rash? Dry skin can lead to more problems. Do not scratch your skin if it itches. You may cause a skin infection by scratching. The following may prevent dry skin, and help your skin look better:    Help soothe your rash. Apply thick cream lotions or petroleum jelly. Cool compresses may also soothe your skin. Apply a cool compress or a cool, wet towel, and then cover it with a dry towel.    Use lukewarm water when you bathe. Hot water may damage your skin more. Pat your skin dry. Do not rub your skin with a towel.    Use detergents, soaps, shampoos, and bubble baths made for sensitive skin.    Wear clothes made of cotton instead of nylon or wool. Cotton is softer, so it will not hurt your skin as much.  When should I seek immediate care?    You have sudden trouble breathing or chest pain.    You are vomiting, have a headache, your throat hurts, or your muscles are painful.  When should I call my doctor?    You have a fever.    You get a cough or cold, or your eyes are red and swollen.    You get open wounds from scratching your skin, or you have a wound that is red, swollen, or painful.    You get sores or blisters in your mouth or genital area, or the skin in those areas is peeling off.    You have new signs or symptoms while being treated with medicines.    You have swelling or pain in your joints.    Your rash lasts longer than 3 months.

## 2024-11-14 NOTE — ED PROVIDER NOTE - PATIENT PORTAL LINK FT
You can access the FollowMyHealth Patient Portal offered by [facility] by registering at the following website: [email or web address]. By joining [employer]’s FollowMyHealth portal, you will also be able to view your health information using other applications (apps) compatible with our system.

## 2024-11-18 DIAGNOSIS — Z91.013 ALLERGY TO SEAFOOD: ICD-10-CM

## 2024-11-18 DIAGNOSIS — R21 RASH AND OTHER NONSPECIFIC SKIN ERUPTION: ICD-10-CM

## 2024-11-18 DIAGNOSIS — Z88.2 ALLERGY STATUS TO SULFONAMIDES: ICD-10-CM

## 2024-11-18 DIAGNOSIS — L29.9 PRURITUS, UNSPECIFIED: ICD-10-CM

## 2024-12-03 NOTE — ED PROVIDER NOTE - NSICDXFAMILYHX_GEN_ALL_CORE_FT
Eugenie Davis is a 6 year old female.   Chief Complaint   Patient presents with    Nose Problem     Nasal fracture X 14 days ago      HPI:   6-year-old in follow-up after nasal bone fracture from running into a pole about 14 days ago    No current outpatient medications on file.      Past Medical History:    Pilomatrixoma of left upper extremity      Social History:  Social History     Socioeconomic History    Marital status: Single   Tobacco Use    Smoking status: Never    Smokeless tobacco: Never   Vaping Use    Vaping status: Never Used   Substance and Sexual Activity    Alcohol use: No    Drug use: No   Other Topics Concern    Second-hand smoke exposure No    Alcohol/drug concerns No    Violence concerns No      Past Surgical History:   Procedure Laterality Date    Other surgical history Left 03/01/2024    Left arm mass excision         EXAM:   There were no vitals taken for this visit.    System Details   Skin Inspection - Normal.   Constitutional Overall appearance - Normal.   Head/Face Symmetric, TMJ tenderness not present    Eyes EOMI, PERRL   Right ear:  Canal clear, TM intact, no ESHA   Left ear:  Canal clear, TM intact, no ESHA   Nose: Septum midline, inferior turbinates not enlarged, nasal valves without collapse   Dorsum is straight no palpable step-offs or crepitus no septal hematoma   Oral cavity/Oropharynx: No lesions or masses on inspection or palpation, tonsils symmetric    Neck: Soft without LAD, thyroid not enlarged  Voice clear/ no stridor   Other:      SCOPES AND PROCEDURES:         AUDIOGRAM AND IMAGING:     Nose xray  CONCLUSION: Minimally displaced nasal bone fracture at the bridge is seen.  The remaining osseous structures are intact.     IMPRESSION:   1. Closed fracture of nasal bone, initial encounter       Recommendations:  -No cosmetic concerns on exam or per the family.  Appears to be healing well.  No septal hematoma or palpable bony step-offs of her dorsum or deviation  -Will  currently observe and discussed this may take another 2 to 3 weeks to fully heal and that she should be careful with her nose and this time.    The patient indicates understanding of these issues and agrees to the plan.  Consult from Dr Sanchez regarding NB Fx    Mario Richard MD  12/3/2024  4:50 PM   FAMILY HISTORY:  No pertinent family history in first degree relatives

## 2025-01-25 ENCOUNTER — EMERGENCY (EMERGENCY)
Facility: HOSPITAL | Age: 75
LOS: 1 days | Discharge: HOME | End: 2025-01-25
Attending: EMERGENCY MEDICINE | Admitting: EMERGENCY MEDICINE
Payer: MEDICARE

## 2025-01-25 VITALS
DIASTOLIC BLOOD PRESSURE: 74 MMHG | RESPIRATION RATE: 18 BRPM | SYSTOLIC BLOOD PRESSURE: 135 MMHG | HEART RATE: 104 BPM | TEMPERATURE: 98 F | OXYGEN SATURATION: 98 %

## 2025-01-25 VITALS
RESPIRATION RATE: 18 BRPM | TEMPERATURE: 98 F | HEART RATE: 85 BPM | SYSTOLIC BLOOD PRESSURE: 124 MMHG | DIASTOLIC BLOOD PRESSURE: 76 MMHG | OXYGEN SATURATION: 96 %

## 2025-01-25 LAB
ANION GAP SERPL CALC-SCNC: 8 MMOL/L — LOW (ref 9–16)
BASOPHILS # BLD AUTO: 0.07 K/UL — SIGNIFICANT CHANGE UP (ref 0–0.2)
BASOPHILS NFR BLD AUTO: 0.6 % — SIGNIFICANT CHANGE UP (ref 0–2)
BUN SERPL-MCNC: 21 MG/DL — SIGNIFICANT CHANGE UP (ref 7–23)
CALCIUM SERPL-MCNC: 9.7 MG/DL — SIGNIFICANT CHANGE UP (ref 8.5–10.5)
CHLORIDE SERPL-SCNC: 106 MMOL/L — SIGNIFICANT CHANGE UP (ref 96–108)
CO2 SERPL-SCNC: 25 MMOL/L — SIGNIFICANT CHANGE UP (ref 22–31)
CREAT SERPL-MCNC: 0.95 MG/DL — SIGNIFICANT CHANGE UP (ref 0.5–1.3)
EGFR: 62 ML/MIN/1.73M2 — SIGNIFICANT CHANGE UP
EOSINOPHIL # BLD AUTO: 0.23 K/UL — SIGNIFICANT CHANGE UP (ref 0–0.5)
EOSINOPHIL NFR BLD AUTO: 1.9 % — SIGNIFICANT CHANGE UP (ref 0–6)
GLUCOSE SERPL-MCNC: 103 MG/DL — HIGH (ref 70–99)
HCT VFR BLD CALC: 44.5 % — SIGNIFICANT CHANGE UP (ref 34.5–45)
HGB BLD-MCNC: 15 G/DL — SIGNIFICANT CHANGE UP (ref 11.5–15.5)
IMM GRANULOCYTES NFR BLD AUTO: 0.3 % — SIGNIFICANT CHANGE UP (ref 0–0.9)
LYMPHOCYTES # BLD AUTO: 2.43 K/UL — SIGNIFICANT CHANGE UP (ref 1–3.3)
LYMPHOCYTES # BLD AUTO: 20.1 % — SIGNIFICANT CHANGE UP (ref 13–44)
MCHC RBC-ENTMCNC: 31.6 PG — SIGNIFICANT CHANGE UP (ref 27–34)
MCHC RBC-ENTMCNC: 33.7 G/DL — SIGNIFICANT CHANGE UP (ref 32–36)
MCV RBC AUTO: 93.7 FL — SIGNIFICANT CHANGE UP (ref 80–100)
MONOCYTES # BLD AUTO: 1.31 K/UL — HIGH (ref 0–0.9)
MONOCYTES NFR BLD AUTO: 10.8 % — SIGNIFICANT CHANGE UP (ref 2–14)
NEUTROPHILS # BLD AUTO: 8.03 K/UL — HIGH (ref 1.8–7.4)
NEUTROPHILS NFR BLD AUTO: 66.3 % — SIGNIFICANT CHANGE UP (ref 43–77)
NRBC # BLD: 0 /100 WBCS — SIGNIFICANT CHANGE UP (ref 0–0)
PLATELET # BLD AUTO: 350 K/UL — SIGNIFICANT CHANGE UP (ref 150–400)
POTASSIUM SERPL-MCNC: 4.8 MMOL/L — SIGNIFICANT CHANGE UP (ref 3.5–5.3)
POTASSIUM SERPL-SCNC: 4.8 MMOL/L — SIGNIFICANT CHANGE UP (ref 3.5–5.3)
RBC # BLD: 4.75 M/UL — SIGNIFICANT CHANGE UP (ref 3.8–5.2)
RBC # FLD: 15.6 % — HIGH (ref 10.3–14.5)
SODIUM SERPL-SCNC: 139 MMOL/L — SIGNIFICANT CHANGE UP (ref 132–145)
WBC # BLD: 12.11 K/UL — HIGH (ref 3.8–10.5)
WBC # FLD AUTO: 12.11 K/UL — HIGH (ref 3.8–10.5)

## 2025-01-25 PROCEDURE — 99285 EMERGENCY DEPT VISIT HI MDM: CPT | Mod: 25

## 2025-01-25 PROCEDURE — 69209 REMOVE IMPACTED EAR WAX UNI: CPT | Mod: RT

## 2025-01-25 PROCEDURE — 70450 CT HEAD/BRAIN W/O DYE: CPT | Mod: 26,XU

## 2025-01-25 PROCEDURE — 70496 CT ANGIOGRAPHY HEAD: CPT | Mod: 26

## 2025-01-25 RX ORDER — MECLIZINE HYDROCHLORIDE 25 MG/1
25 TABLET ORAL ONCE
Refills: 0 | Status: COMPLETED | OUTPATIENT
Start: 2025-01-25 | End: 2025-01-25

## 2025-01-25 RX ORDER — SODIUM CHLORIDE 9 MG/ML
500 INJECTION, SOLUTION INTRAMUSCULAR; INTRAVENOUS; SUBCUTANEOUS ONCE
Refills: 0 | Status: COMPLETED | OUTPATIENT
Start: 2025-01-25 | End: 2025-01-25

## 2025-01-25 RX ORDER — CARBAMIDE PEROXIDE 6.5 %
5 DROPS OTIC (EAR) ONCE
Refills: 0 | Status: COMPLETED | OUTPATIENT
Start: 2025-01-25 | End: 2025-01-25

## 2025-01-25 RX ADMIN — SODIUM CHLORIDE 500 MILLILITER(S): 9 INJECTION, SOLUTION INTRAMUSCULAR; INTRAVENOUS; SUBCUTANEOUS at 22:06

## 2025-01-25 RX ADMIN — SODIUM CHLORIDE 500 MILLILITER(S): 9 INJECTION, SOLUTION INTRAMUSCULAR; INTRAVENOUS; SUBCUTANEOUS at 20:52

## 2025-01-25 RX ADMIN — Medication 5 DROP(S): at 18:28

## 2025-01-25 RX ADMIN — MECLIZINE HYDROCHLORIDE 25 MILLIGRAM(S): 25 TABLET ORAL at 19:43

## 2025-01-25 NOTE — ED PROVIDER NOTE - CARE PROVIDER_API CALL
Scot Velasquez  Otolaryngology  [address]  [place] [address]  Phone: [phone]  Fax: [phone]  Follow Up Time:    (1) trace, left/(5) normal, right

## 2025-01-25 NOTE — ED PROVIDER NOTE - CLINICAL SUMMARY MEDICAL DECISION MAKING FREE TEXT BOX
IMPRESSION: 74-year-old female history of syncope, HTN, hypothyroidism presents with one day of pulsatile tinnitus to right ear, likely due to cerumen occlusion rule out other CNS or vascular etiology.    PLAN:    # PROCEDURES: Cerumen removal  Further management and disposition as per ED work-up and clinical course.  After cerumen removal, if persistent or recurrent pulsatile tinnitus or patient clinical concerns, will discuss with patent the risks, benefits, alternatives to obtaining IMAGING: CT head & CTA head to rule out CNS / vascular etiology of symptoms.  # DISPOSITION:  Disposition decision determined by considering the complexity and severity of the patient presentation, comorbidities, current medical needs, the risk of an adverse outcome(s), and/or other social determinates of health.  Likely DISHCARGE with urgent outpatient follow-up with strict return precautions. Discharge with cerumen impaction instructions and ENT follow-up.

## 2025-01-25 NOTE — ED PROVIDER NOTE - PROGRESS NOTE DETAILS
Successful removal of cerumen from right ear with improvement of hearing.  Discussed with patient the risks, benefits, and alternatives regarding further evaluation for pulsatile tinnitus including discharge with RTER if recurrence / persistence versus obtaining CT head / CTA head in ED.  Patient elects to obtain CT head / CTA head in ED. ED work-up negative. Symptomatically improved with no further episodes of tinnitus. OK for discharge with urgent outpatient follow-up.

## 2025-01-25 NOTE — ED PROVIDER NOTE - NSFOLLOWUPINSTRUCTIONS_ED_ALL_ED_FT
Follow-up with your doctor within 1 week or sooner if symptoms persiste or worsen.    Follow-up with an ENT (eg Dr. Scot Velasquez) if you have persistent or recurrent tinnitus.    Return to ER for headaches, vision changes, weakness, numnbess, fever, hearing change or loss, continued or worsening symptoms or for any concerns.    Earwax Buildup, Adult  Your ears make something called earwax. It helps keep germs called bacteria away and protects the skin in your ears. Sometimes, too much earwax can build up. This can cause discomfort or make it harder to hear.    What are the causes?  Earwax buildup can happen when you have too much earwax in your ears. Earwax is made in the outer part of your ear canal. It's supposed to fall out in small amounts over time.    But if your ears aren't able to clean themselves like they should, earwax can build up.    What increases the risk?  You're more likely to get earwax buildup if:  You clean your ears with cotton swabs.  You pick at your ears.  You use earplugs or in-ear headphones a lot.  You wear hearing aids.  You may also be more likely to get it if:  You're male.  You're older.  Your ears naturally make more earwax.  You have narrow ear canals or extra hair in your ears.  Your earwax is too thick or sticky.  You have eczema.  You're dehydrated. This means there's not enough fluid in your body.  What are the signs or symptoms?  Symptoms of earwax buildup include:  Not being able to hear as well.  A feeling of fullness in your ear.  Feeling like your ear is plugged.  Fluid coming from your ear.  Ear pain or an itchy ear.  Ringing in your ear.  Coughing or problems with balance.  How is this diagnosed?  Earwax buildup may be diagnosed based on your symptoms, medical history, and an ear exam. During the exam, your health care provider will look into your ear with a tool called an otoscope.    You may also have tests, such as a hearing test.    How is this treated?  Earwax buildup may be treated by:  Using ear drops.  Having the earwax removed by a provider. The provider may:  Flush the ear with water.  Use a tool called a curette that has a loop on the end.  Use a suction device.  Having surgery. This may be done in severe cases.  Follow these instructions at home:  A sign showing that a person should not use a cotton swab in the ear.  Cleaning your ears    Clean your ears as told by your provider. You can clean the outside of your ears with a washcloth or tissue.  Do not overclean your ears.  Do not put anything into your ear unless told. This includes cotton swabs.  General instructions    Take over-the-counter and prescription medicines only as told by your provider.  Drink enough fluid to keep your pee (urine) pale yellow. This helps thin the earwax.  If you have hearing aids, clean them as told.  Keep all follow-up visits. If earwax builds up in your ears often or if you use hearing aids, ask your provider how often you should have your ears cleaned.  Contact a health care provider if:  Your ear pain gets worse.  You have a fever.  You have pus, blood, or other fluid coming from your ear.  You have hearing loss.  You have ringing in your ears that won't go away.  You feel like the room is spinning. This is called vertigo.  Your symptoms don't get better with treatment.  This information is not intended to replace advice given to you by your health care provider. Make sure you discuss any questions you have with your health care provider.    Tinnitus  Tinnitus is when you hear a sound that there's no actual source for. It may sound like ringing in your ears or something else. The sound may be loud, soft, or somewhere in between. It can last for a few seconds or be constant for days. It can come and go.    Almost everyone has tinnitus at some point. It's not the same as hearing loss. But you may need to see a health care provider if:  It lasts for a long time.  It comes back often.  You have trouble sleeping and focusing.  What are the causes?  The cause of tinnitus is often unknown. In some cases, you may get it if:  You're around loud noises, such as from machines or music.  An object gets stuck in your ear.  Earwax builds up in your ear.  You drink a lot of alcohol or caffeine.  You take certain medicines.  You start to lose your hearing.  You may also get it from some medical conditions. These may include:  Ear or sinus infections.  Heart diseases.  High blood pressure.  Allergies.  Ménière's disease.  Problems with your thyroid.  A tumor. This is a growth of cells that isn't normal.  A weak, bulging blood vessel called an aneurysm near your ear.  What increases the risk?  You may be more likely to get tinnitus if:  You're around loud noises a lot.  You're older.  You drink alcohol.  You smoke.  What are the signs or symptoms?  The main symptom is hearing a sound that there's no source for. It may sound like ringing. It may also sound like:  Buzzing.  Sizzling.  Blowing air.  Hissing.  Whistling.  Other sounds may include:  Roaring.  Running water.  A musical note.  Tapping.  Humming.  You may have symptoms in one ear or both ears.    How is this diagnosed?  Tinnitus is diagnosed based on your symptoms, your medical history, and an exam. Your provider may do a full hearing test if your tinnitus:  Is in just one ear.  Makes it hard for you to hear.  Lasts 6 months or longer.  You may also need to see an expert in hearing disorders called an audiologist. They may ask you about your symptoms and how tinnitus affects your daily life. You may have other tests done. These may include:  A CT scan.  An MRI.  An angiogram. This shows how blood flows through your blood vessels.  How is this treated?  Treatment may include:  Therapy to help you manage the stress of living with tinnitus.  Finding ways to mask or cover the sound of tinnitus. These include:  Sound or white noise machines.  Devices that fit in your ear and play sounds or music.  A loud humidifier.  Acoustic neural stimulation. This is when you use headphones to listen to music that has a special signal in it. Over time, this signal may change some of the pathways in your brain. This can make you less sensitive to tinnitus. This treatment is used for very severe cases.  Using hearing aids or cochlear implants if your tinnitus is from hearing loss.  If your tinnitus is caused by a medical condition, treating the condition may make it go away.    Follow these instructions at home:  Managing symptoms    Outline of person in bed with head of bed raised.  A person wearing headphones.  Try to avoid being in loud places or around loud noises.  Wear earplugs or headphones when you're around loud noises.  Find ways to reduce stress. These may include meditation, yoga, or deep breathing.  Sleep with your head slightly raised.  General instructions    Take over-the-counter and prescription medicines only as told by your provider.  Track the things that cause symptoms (triggers). Try to avoid these things.  To stop your tinnitus from getting worse:  Do not drink alcohol.  Do not have caffeine.  Do not use any products that contain nicotine or tobacco. These products include cigarettes, chewing tobacco, and vaping devices, such as e-cigarettes. If you need help quitting, ask your provider.  Avoid using too much salt.  Get enough sleep each night.  Where to find more information  American Tinnitus Association: sonya.org  Contact a health care provider if:  Your symptoms last for 3 weeks or longer without stopping.  You have sudden hearing loss.  Your symptoms get worse or don't get better with home care.  You can't manage the stress of living with tinnitus.  Get help right away if:  You get tinnitus after a head injury.  You have tinnitus and:  Dizziness.  Nausea and vomiting.  Loss of balance.  A sudden, severe headache.  Changes to your eyesight.  Weakness in your face, arms, or legs.  These symptoms may be an emergency. Get help right away. Call 911.  Do not wait to see if the symptoms will go away.  Do not drive yourself to the hospital.  This information is not intended to replace advice given to you by your health care provider. Make sure you discuss any questions you have with your health care provider.

## 2025-01-25 NOTE — ED PROCEDURE NOTE - CPROC ED INFORMED CONSENT1
./Benefits, risks, and possible complications of procedure explained to patient/caregiver who verbalized understanding and gave verbal consent. Benefits, risks, and possible complications of procedure explained to patient/caregiver who verbalized understanding and gave verbal consent.

## 2025-01-25 NOTE — ED PROVIDER NOTE - OBJECTIVE STATEMENT
74-year-old female history of hypertension, syncope, hypothyroidism presents with pulsatile tinnitus to right ear associated with decreased hearing since this morning. No fevers, chills, weakness, numbness, vertigo, chest pain, shortness of breath, or other concerning complaints reported.  Used OTC Debrox without relief of symptoms.    PMH: Syncope, Hypothyroidism, HTN  Meds: Levothyroxine, Amlodipine-benazepril, atorvastatin  All: Sufa (rash), Shellfish (rash, no anaphylaxis), Atenolol, Cephalexin  PCP: Humza Carlson ([phone])

## 2025-01-25 NOTE — ED ADULT NURSE NOTE - OBJECTIVE STATEMENT
pt presents to the ED d/t complaints of R ear fullness x1 day. denies decreased hearing, fever/chills

## 2025-01-25 NOTE — ED PROCEDURE NOTE - PROCEDURE ADDITIONAL DETAILS
Indication: cerumen impaction, Rt ear  Procedure: cerumenolytic (carbamide earwax dops) administered.  Ear flushed with normal saline and large amount of cerumen manually extracted from EAC.  Visualized TM clear after procedure.  Patient tolerated procedure well with no immediate side effects.

## 2025-01-25 NOTE — ED PROVIDER NOTE - PHYSICAL EXAMINATION
Gen: WD WN NAD  HEENT: NCAT, PERRL EOMI O/P: MMM  Ears: Rt: occluded by cerumen, Lt; EAC: soft cerumen, TM: clear  Neck: supple, NT   CV: RRR no M/R/G noted  Resp: CTAB no R/R/W noted  Abd: soft NT/ND BS nl  Ext: No edema, 2+ pulses  Neuro: AAOx3 grossly intact without focality

## 2025-01-25 NOTE — ED PROVIDER NOTE - CARE PLAN
1 Principal Discharge DX:	Cerumen impaction  Assessment and plan of treatment:	See MDM  Secondary Diagnosis:	Tinnitus

## 2025-01-25 NOTE — ED PROVIDER NOTE - PATIENT PORTAL LINK FT
You can access the FollowMyHealth Patient Portal offered by [facility] by registering at the following website: [email or web address]. By joining [facility]’s FollowMyHealth portal, you will also be able to view your health information using other applications (apps) compatible with our system.

## 2025-01-29 DIAGNOSIS — H93.12 TINNITUS, LEFT EAR: ICD-10-CM

## 2025-01-29 DIAGNOSIS — H93.11 TINNITUS, RIGHT EAR: ICD-10-CM

## 2025-01-29 DIAGNOSIS — I10 ESSENTIAL (PRIMARY) HYPERTENSION: ICD-10-CM

## 2025-01-29 DIAGNOSIS — H61.21 IMPACTED CERUMEN, RIGHT EAR: ICD-10-CM

## 2025-01-29 DIAGNOSIS — Z88.2 ALLERGY STATUS TO SULFONAMIDES: ICD-10-CM

## 2025-01-29 DIAGNOSIS — E03.9 HYPOTHYROIDISM, UNSPECIFIED: ICD-10-CM
